# Patient Record
Sex: MALE | Race: WHITE | NOT HISPANIC OR LATINO | Employment: FULL TIME | URBAN - METROPOLITAN AREA
[De-identification: names, ages, dates, MRNs, and addresses within clinical notes are randomized per-mention and may not be internally consistent; named-entity substitution may affect disease eponyms.]

---

## 2017-11-14 ENCOUNTER — APPOINTMENT (EMERGENCY)
Dept: CT IMAGING | Facility: HOSPITAL | Age: 57
DRG: 391 | End: 2017-11-14
Payer: COMMERCIAL

## 2017-11-14 ENCOUNTER — HOSPITAL ENCOUNTER (INPATIENT)
Facility: HOSPITAL | Age: 57
LOS: 2 days | Discharge: HOME | DRG: 391 | End: 2017-11-16
Attending: EMERGENCY MEDICINE | Admitting: HOSPITALIST
Payer: COMMERCIAL

## 2017-11-14 DIAGNOSIS — K76.9 LIVER LESION: ICD-10-CM

## 2017-11-14 DIAGNOSIS — E87.1 ACUTE HYPONATREMIA: ICD-10-CM

## 2017-11-14 DIAGNOSIS — D69.6 THROMBOCYTOPENIA (HCC): ICD-10-CM

## 2017-11-14 DIAGNOSIS — K52.9 ENTERITIS: Primary | ICD-10-CM

## 2017-11-14 PROBLEM — R19.7 DIARRHEA: Status: ACTIVE | Noted: 2017-11-14

## 2017-11-14 LAB
ALBUMIN SERPL BCP-MCNC: 2.9 G/DL (ref 3.5–5)
ALP SERPL-CCNC: 92 U/L (ref 46–116)
ALT SERPL W P-5'-P-CCNC: 123 U/L (ref 12–78)
AMORPH PHOS CRY URNS QL MICRO: ABNORMAL /HPF
ANION GAP SERPL CALCULATED.3IONS-SCNC: 13 MMOL/L (ref 4–13)
APTT PPP: 33 SECONDS (ref 23–35)
AST SERPL W P-5'-P-CCNC: 66 U/L (ref 5–45)
ATRIAL RATE: 103 BPM
BACTERIA UR QL AUTO: ABNORMAL /HPF
BASOPHILS # BLD MANUAL: 0 THOUSAND/UL (ref 0–0.1)
BASOPHILS NFR MAR MANUAL: 0 % (ref 0–1)
BILIRUB SERPL-MCNC: 1 MG/DL (ref 0.2–1)
BILIRUB UR QL STRIP: NEGATIVE
BUN SERPL-MCNC: 20 MG/DL (ref 5–25)
CALCIUM SERPL-MCNC: 8.2 MG/DL (ref 8.3–10.1)
CAMPYLOBACTER DNA SPEC NAA+PROBE: NORMAL
CHLORIDE SERPL-SCNC: 87 MMOL/L (ref 100–108)
CLARITY UR: CLEAR
CO2 SERPL-SCNC: 21 MMOL/L (ref 21–32)
COARSE GRAN CASTS URNS QL MICRO: ABNORMAL /LPF
COLOR UR: YELLOW
CREAT SERPL-MCNC: 1.01 MG/DL (ref 0.6–1.3)
CREAT UR-MCNC: 96.4 MG/DL
EOSINOPHIL # BLD MANUAL: 0 THOUSAND/UL (ref 0–0.4)
EOSINOPHIL NFR BLD MANUAL: 0 % (ref 0–6)
ERYTHROCYTE [DISTWIDTH] IN BLOOD BY AUTOMATED COUNT: 13.7 % (ref 11.6–15.1)
FLUAV AG SPEC QL: NORMAL
FLUBV AG SPEC QL: NORMAL
GFR SERPL CREATININE-BSD FRML MDRD: 83 ML/MIN/1.73SQ M
GLUCOSE SERPL-MCNC: 120 MG/DL (ref 65–140)
GLUCOSE UR STRIP-MCNC: NEGATIVE MG/DL
HCT VFR BLD AUTO: 37.9 % (ref 36.5–49.3)
HEMOCCULT STL QL: NEGATIVE
HGB BLD-MCNC: 13.8 G/DL (ref 12–17)
HGB UR QL STRIP.AUTO: ABNORMAL
INR PPP: 1.07 (ref 0.86–1.16)
KETONES UR STRIP-MCNC: ABNORMAL MG/DL
LEUKOCYTE ESTERASE UR QL STRIP: NEGATIVE
LYMPHOCYTES # BLD AUTO: 1.59 THOUSAND/UL (ref 0.6–4.47)
LYMPHOCYTES # BLD AUTO: 23 % (ref 14–44)
MAGNESIUM SERPL-MCNC: 1.9 MG/DL (ref 1.6–2.6)
MCH RBC QN AUTO: 28.4 PG (ref 26.8–34.3)
MCHC RBC AUTO-ENTMCNC: 36.4 G/DL (ref 31.4–37.4)
MCV RBC AUTO: 78 FL (ref 82–98)
MONOCYTES # BLD AUTO: 0.69 THOUSAND/UL (ref 0–1.22)
MONOCYTES NFR BLD: 10 % (ref 4–12)
MUCOUS THREADS UR QL AUTO: ABNORMAL
NEUTROPHILS # BLD MANUAL: 4.37 THOUSAND/UL (ref 1.85–7.62)
NEUTS BAND NFR BLD MANUAL: 4 % (ref 0–8)
NEUTS SEG NFR BLD AUTO: 59 % (ref 43–75)
NITRITE UR QL STRIP: NEGATIVE
NON-SQ EPI CELLS URNS QL MICRO: ABNORMAL /HPF
OSMOLALITY UR/SERPL-RTO: 256 MMOL/KG (ref 282–298)
OSMOLALITY UR: 622 MMOL/KG
P AXIS: 74 DEGREES
PH UR STRIP.AUTO: 6.5 [PH] (ref 4.5–8)
PLATELET # BLD AUTO: 49 THOUSANDS/UL (ref 149–390)
PLATELET BLD QL SMEAR: ABNORMAL
PMV BLD AUTO: 12.5 FL (ref 8.9–12.7)
POTASSIUM SERPL-SCNC: 3.7 MMOL/L (ref 3.5–5.3)
POTASSIUM UR-SCNC: 29 MMOL/L (ref 1–300)
PR INTERVAL: 160 MS
PROT SERPL-MCNC: 6.9 G/DL (ref 6.4–8.2)
PROT UR STRIP-MCNC: ABNORMAL MG/DL
PROTHROMBIN TIME: 13.7 SECONDS (ref 12.1–14.4)
QRS AXIS: 70 DEGREES
QRSD INTERVAL: 90 MS
QT INTERVAL: 348 MS
QTC INTERVAL: 455 MS
RBC # BLD AUTO: 4.86 MILLION/UL (ref 3.88–5.62)
RBC #/AREA URNS AUTO: ABNORMAL /HPF
RBC MORPH BLD: NORMAL
RSV B RNA SPEC QL NAA+PROBE: NORMAL
RV AG STL QL: NEGATIVE
SALMONELLA DNA SPEC QL NAA+PROBE: NORMAL
SHIGA TOXIN STX GENE SPEC NAA+PROBE: NORMAL
SHIGELLA DNA SPEC QL NAA+PROBE: NORMAL
SODIUM 24H UR-SCNC: 29 MOL/L
SODIUM SERPL-SCNC: 121 MMOL/L (ref 136–145)
SP GR UR STRIP.AUTO: <=1.005 (ref 1–1.03)
T WAVE AXIS: 69 DEGREES
TOTAL CELLS COUNTED SPEC: 100
TSH SERPL DL<=0.05 MIU/L-ACNC: 2.23 UIU/ML (ref 0.36–3.74)
UROBILINOGEN UR QL STRIP.AUTO: 1 E.U./DL
VARIANT LYMPHS # BLD AUTO: 4 %
VENTRICULAR RATE: 103 BPM
WBC # BLD AUTO: 6.93 THOUSAND/UL (ref 4.31–10.16)
WBC #/AREA URNS AUTO: ABNORMAL /HPF

## 2017-11-14 PROCEDURE — 84133 ASSAY OF URINE POTASSIUM: CPT | Performed by: EMERGENCY MEDICINE

## 2017-11-14 PROCEDURE — 85610 PROTHROMBIN TIME: CPT | Performed by: EMERGENCY MEDICINE

## 2017-11-14 PROCEDURE — 99285 EMERGENCY DEPT VISIT HI MDM: CPT

## 2017-11-14 PROCEDURE — 87425 ROTAVIRUS AG IA: CPT | Performed by: EMERGENCY MEDICINE

## 2017-11-14 PROCEDURE — 84443 ASSAY THYROID STIM HORMONE: CPT | Performed by: EMERGENCY MEDICINE

## 2017-11-14 PROCEDURE — 82570 ASSAY OF URINE CREATININE: CPT | Performed by: EMERGENCY MEDICINE

## 2017-11-14 PROCEDURE — 84300 ASSAY OF URINE SODIUM: CPT | Performed by: EMERGENCY MEDICINE

## 2017-11-14 PROCEDURE — 83930 ASSAY OF BLOOD OSMOLALITY: CPT | Performed by: EMERGENCY MEDICINE

## 2017-11-14 PROCEDURE — 85027 COMPLETE CBC AUTOMATED: CPT | Performed by: EMERGENCY MEDICINE

## 2017-11-14 PROCEDURE — 83935 ASSAY OF URINE OSMOLALITY: CPT | Performed by: EMERGENCY MEDICINE

## 2017-11-14 PROCEDURE — 87040 BLOOD CULTURE FOR BACTERIA: CPT | Performed by: EMERGENCY MEDICINE

## 2017-11-14 PROCEDURE — 96361 HYDRATE IV INFUSION ADD-ON: CPT

## 2017-11-14 PROCEDURE — 87798 DETECT AGENT NOS DNA AMP: CPT | Performed by: EMERGENCY MEDICINE

## 2017-11-14 PROCEDURE — 36415 COLL VENOUS BLD VENIPUNCTURE: CPT | Performed by: EMERGENCY MEDICINE

## 2017-11-14 PROCEDURE — 74177 CT ABD & PELVIS W/CONTRAST: CPT

## 2017-11-14 PROCEDURE — 85007 BL SMEAR W/DIFF WBC COUNT: CPT | Performed by: EMERGENCY MEDICINE

## 2017-11-14 PROCEDURE — 89055 LEUKOCYTE ASSESSMENT FECAL: CPT | Performed by: EMERGENCY MEDICINE

## 2017-11-14 PROCEDURE — 87505 NFCT AGENT DETECTION GI: CPT | Performed by: EMERGENCY MEDICINE

## 2017-11-14 PROCEDURE — 82272 OCCULT BLD FECES 1-3 TESTS: CPT | Performed by: EMERGENCY MEDICINE

## 2017-11-14 PROCEDURE — 96360 HYDRATION IV INFUSION INIT: CPT

## 2017-11-14 PROCEDURE — 81001 URINALYSIS AUTO W/SCOPE: CPT | Performed by: EMERGENCY MEDICINE

## 2017-11-14 PROCEDURE — 80053 COMPREHEN METABOLIC PANEL: CPT | Performed by: EMERGENCY MEDICINE

## 2017-11-14 PROCEDURE — 93005 ELECTROCARDIOGRAM TRACING: CPT | Performed by: EMERGENCY MEDICINE

## 2017-11-14 PROCEDURE — 83735 ASSAY OF MAGNESIUM: CPT | Performed by: EMERGENCY MEDICINE

## 2017-11-14 PROCEDURE — 85730 THROMBOPLASTIN TIME PARTIAL: CPT | Performed by: EMERGENCY MEDICINE

## 2017-11-14 PROCEDURE — 87493 C DIFF AMPLIFIED PROBE: CPT | Performed by: HOSPITALIST

## 2017-11-14 RX ORDER — ACETAMINOPHEN 325 MG/1
650 TABLET ORAL EVERY 6 HOURS PRN
Status: DISCONTINUED | OUTPATIENT
Start: 2017-11-14 | End: 2017-11-16 | Stop reason: HOSPADM

## 2017-11-14 RX ORDER — DEXTROSE AND SODIUM CHLORIDE 5; .9 G/100ML; G/100ML
150 INJECTION, SOLUTION INTRAVENOUS CONTINUOUS
Status: DISCONTINUED | OUTPATIENT
Start: 2017-11-14 | End: 2017-11-16 | Stop reason: HOSPADM

## 2017-11-14 RX ORDER — MORPHINE SULFATE 4 MG/ML
4 INJECTION, SOLUTION INTRAMUSCULAR; INTRAVENOUS ONCE
Status: COMPLETED | OUTPATIENT
Start: 2017-11-14 | End: 2017-11-14

## 2017-11-14 RX ORDER — LISINOPRIL 20 MG/1
20 TABLET ORAL DAILY
COMMUNITY

## 2017-11-14 RX ADMIN — SODIUM CHLORIDE 1000 ML: 0.9 INJECTION, SOLUTION INTRAVENOUS at 07:43

## 2017-11-14 RX ADMIN — DIPHENHYDRAMINE HYDROCHLORIDE 50 MG: 50 INJECTION, SOLUTION INTRAMUSCULAR; INTRAVENOUS at 12:16

## 2017-11-14 RX ADMIN — DEXTROSE AND SODIUM CHLORIDE 150 ML/HR: 5; 900 INJECTION, SOLUTION INTRAVENOUS at 11:47

## 2017-11-14 RX ADMIN — SODIUM CHLORIDE 1000 ML: 0.9 INJECTION, SOLUTION INTRAVENOUS at 09:25

## 2017-11-14 RX ADMIN — IOHEXOL 100 ML: 350 INJECTION, SOLUTION INTRAVENOUS at 08:38

## 2017-11-14 RX ADMIN — MORPHINE SULFATE 4 MG: 4 INJECTION, SOLUTION INTRAMUSCULAR; INTRAVENOUS at 18:37

## 2017-11-14 RX ADMIN — ACETAMINOPHEN 650 MG: 325 TABLET ORAL at 11:47

## 2017-11-14 RX ADMIN — DEXTROSE AND SODIUM CHLORIDE 150 ML/HR: 5; 900 INJECTION, SOLUTION INTRAVENOUS at 18:42

## 2017-11-14 NOTE — ED NOTES
Pt states he woke up during night diaphoretic and thought he was hallucinating, took Nyquil at approx 2400  Patel Gibbons RN  11/14/17 9607

## 2017-11-14 NOTE — ED PROVIDER NOTES
History  Chief Complaint   Patient presents with    Diarrhea     Presents with C/O diarrhea, H/A, fever, chills, body aches x 1 week  No vomiting  This 60-year-old man complains of being ill for 2 weeks  He states that he had headache and fever for the first week and a half  Over the past 4 days he has had abdominal pain and frequent diarrhea in addition to fever and headache  He has myalgias and has had difficulty sleeping due to his headache and diarrhea  He notes some blurred vision recently  Denies nausea, vomiting, sore throat, cough, rash and dysuria  He noted dark urine recently  He feels he has been unable to drink enough fluids to keep up with his diarrhea  He denies recent foreign travel or ill contacts  States that his son this morning woke up with headache  Prior to Admission Medications   Prescriptions Last Dose Informant Patient Reported? Taking?   lisinopril (ZESTRIL) 20 mg tablet 11/13/2017 at Unknown time  Yes Yes   Sig: Take 20 mg by mouth daily      Facility-Administered Medications: None       Past Medical History:   Diagnosis Date    BPH (benign prostatic hyperplasia)     Hypertension        History reviewed  No pertinent surgical history  History reviewed  No pertinent family history  I have reviewed and agree with the history as documented  Social History   Substance Use Topics    Smoking status: Never Smoker    Smokeless tobacco: Never Used    Alcohol use 1 2 oz/week     2 Glasses of wine per week      Comment: a day    last consumption thursday        Review of Systems   Constitutional: Positive for activity change, appetite change, chills, diaphoresis, fatigue and fever  HENT: Negative  Eyes: Negative  Respiratory: Negative  Cardiovascular: Negative  Gastrointestinal: Positive for abdominal pain and diarrhea  Negative for abdominal distention, blood in stool and constipation  Endocrine: Negative  Genitourinary: Negative           Dark urine   Musculoskeletal: Positive for myalgias  Negative for neck pain and neck stiffness  Skin: Negative  Allergic/Immunologic: Negative  Neurological: Negative  Hematological: Negative  Psychiatric/Behavioral: Negative  All other systems reviewed and are negative  Physical Exam  ED Triage Vitals   Temperature Pulse Respirations Blood Pressure SpO2   11/14/17 0627 11/14/17 0626 11/14/17 0626 11/14/17 0622 11/14/17 0626   98 °F (36 7 °C) 101 21 167/86 94 %      Temp Source Heart Rate Source Patient Position - Orthostatic VS BP Location FiO2 (%)   11/14/17 0627 11/14/17 0627 11/14/17 0627 11/14/17 0627 --   Temporal Monitor Lying Right arm       Pain Score       11/14/17 0627       9           Orthostatic Vital Signs  Vitals:    11/14/17 1000 11/14/17 1107 11/14/17 1541 11/14/17 2246   BP: 147/81 141/71 122/77 133/84   Pulse: (!) 107 98 86 88   Patient Position - Orthostatic VS:  Lying Lying        Physical Exam   Constitutional: He is oriented to person, place, and time  He appears well-developed and well-nourished  No distress  HENT:   Head: Normocephalic and atraumatic  Right Ear: External ear normal    Left Ear: External ear normal    Dry oral mucosa  Eyes: Conjunctivae and EOM are normal  Pupils are equal, round, and reactive to light  Neck: Normal range of motion  Neck supple  No JVD present  Cardiovascular: Normal rate, regular rhythm, normal heart sounds and intact distal pulses  No murmur heard  Pulmonary/Chest: Effort normal and breath sounds normal    Abdominal: Soft  Bowel sounds are normal  He exhibits no mass  There is tenderness (  Of lower abdomen)  There is no rebound and no guarding  Musculoskeletal: Normal range of motion  He exhibits no edema or tenderness  Lymphadenopathy:     He has no cervical adenopathy  Neurological: He is alert and oriented to person, place, and time  He has normal reflexes  No cranial nerve deficit   Coordination normal    Skin: Skin is warm and dry  No rash noted  He is not diaphoretic  Slight edema and erythema of the skin over upper lip, approximately 1 cm x 0 5 cm  Psychiatric: He has a normal mood and affect  His behavior is normal    Nursing note and vitals reviewed        ED Medications  Medications   dextrose 5 % and sodium chloride 0 9 % infusion (150 mL/hr Intravenous New Bag 11/14/17 1842)   acetaminophen (TYLENOL) tablet 650 mg (650 mg Oral Given 11/14/17 1147)   lisinopril (ZESTRIL) tablet 20 mg (not administered)   sodium chloride 0 9 % bolus 1,000 mL (0 mL Intravenous Stopped 11/14/17 0914)   morphine (PF) 4 mg/mL injection 4 mg (4 mg Intravenous Given 11/14/17 1837)   iohexol (OMNIPAQUE) 350 MG/ML injection (MULTI-DOSE) 100 mL (100 mL Intravenous Given 11/14/17 0838)   sodium chloride 0 9 % bolus 1,000 mL (0 mL Intravenous Stopped 11/14/17 1025)   diphenhydrAMINE (BENADRYL) 50 mg in sodium chloride 0 9 % 50 mL IVPB (50 mg Intravenous New Bag 11/14/17 1216)       Diagnostic Studies  Results Reviewed     Procedure Component Value Units Date/Time    Stool Enteric Bacterial Panel by PCR [[id number]]  (Normal) Collected:  11/14/17 0737    Lab Status:  Final result Specimen:  Stool from Per Rectum Updated:  11/14/17 1454     Salmonella sp PCR None Detected     Shigella sp/Enteroinvasive E  coli (EIEC) PCR None Detected     Campylobacter sp (jejuni and coli) PCR None Detected     Shiga toxin 1/Shiga tonix 2 genes PCR None Detected    Sodium, urine, random [[id number]]  (Normal) Collected:  11/14/17 1027    Lab Status:  Final result Specimen:  Urine from Urine, Clean Catch Updated:  11/14/17 1400     Sodium, Ur 29    Potassium, urine, random [[id number]]  (Normal) Collected:  11/14/17 1027    Lab Status:  Final result Specimen:  Urine from Urine, Clean Catch Updated:  11/14/17 1400     Potassium Urine Timed 29 0 mmol/L     Creatinine, urine, random [[id number]]  (Normal) Collected:  11/14/17 1027    Lab Status:  Final result Specimen: Urine from Urine, Clean Catch Updated:  11/14/17 1333     Creatinine, Ur 96 4 mg/dL     Osmolality, urine [[id number]]  (Normal) Collected:  11/14/17 1027    Lab Status:  Final result Specimen:  Urine from Urine, Clean Catch Updated:  11/14/17 1247     Osmolality, Ur 622 mmol/KG     Rotavirus antigen, stool [[id number]]  (Normal) Collected:  11/14/17 0736    Lab Status:  Final result Specimen:  Stool from Rectum Updated:  11/14/17 1239     Rotavirus Negative    Influenza A/B and RSV by PCR (indicated for patients >2 mo of age) [[id number]]  (Normal) Collected:  11/14/17 0707    Lab Status:  Final result Specimen:  Nasopharyngeal from Nasopharyngeal Swab Updated:  11/14/17 1232     INFLU A PCR None Detected     INFLU B PCR None Detected     RSV PCR None Detected    Osmolality [[id number]]  (Abnormal) Collected:  11/14/17 1027    Lab Status:  Final result Specimen:  Blood from Arm, Right Updated:  11/14/17 1232     Osmolality Serum 256 (L) mmol/KG     Urine Microscopic [[id number]]  (Abnormal) Collected:  11/14/17 1032    Lab Status:  Final result Specimen:  Urine from Urine, Clean Catch Updated:  11/14/17 1104     RBC, UA 0-1 (A) /hpf      WBC, UA 0-1 (A) /hpf      Epithelial Cells Occasional /hpf      Bacteria, UA Moderate (A) /hpf      COARSE GRANULAR CASTS 10-20 /lpf      AMORPH PHOSPATES Moderate /hpf      MUCOUS THREADS Occasional    UA w Reflex to Microscopic w Reflex to Culture [[id number]]  (Abnormal) Collected:  11/14/17 1032    Lab Status:  Final result Specimen:  Urine from Urine, Clean Catch Updated:  11/14/17 1047     Color, UA Yellow     Clarity, UA Clear     Specific Gravity, UA <=1 005     pH, UA 6 5     Leukocytes, UA Negative     Nitrite, UA Negative     Protein, UA 30 (1+) (A) mg/dl      Glucose, UA Negative mg/dl      Ketones, UA 40 (2+) (A) mg/dl      Urobilinogen, UA 1 0 E U /dl      Bilirubin, UA Negative     Blood, UA Small (A)    Magnesium [[id number]]  (Normal) Collected:  11/14/17 0707    Lab Status: Final result Specimen:  Blood from Arm, Right Updated:  11/14/17 1012     Magnesium 1 9 mg/dL     TSH [[id number]]  (Normal) Collected:  11/14/17 0707    Lab Status:  Final result Specimen:  Blood from Arm, Right Updated:  11/14/17 0926     TSH 3RD GENERATON 2 226 uIU/mL     Narrative:         Patients undergoing fluorescein dye angiography may retain small amounts of fluorescein in the body for 48-72 hours post procedure  Samples containing fluorescein can produce falsely depressed TSH values  If the patient had this procedure,a specimen should be resubmitted post fluorescein clearance  APTT [[id number]]  (Normal) Collected:  11/14/17 0855    Lab Status:  Final result Specimen:  Blood from Arm, Right Updated:  11/14/17 0916     PTT 33 seconds     Narrative:          Therapeutic Heparin Range = 60-90 seconds    Protime-INR [[id number]]  (Normal) Collected:  11/14/17 0855    Lab Status:  Final result Specimen:  Blood from Arm, Right Updated:  11/14/17 0916     Protime 13 7 seconds      INR 1 07    Occult Blood 1-3 Stool, (Diagnostic) [[id number]] Collected:  11/14/17 0736    Lab Status:  Final result Specimen:  Stool from Rectum Updated:  11/14/17 0826     Fecal Occult Blood Diagnostic Negative     Fecal Occult Blood Diagnostic 2 --     Fecal Occult Blood Diagnostic 3 --    CBC and differential [[id number]]  (Abnormal) Collected:  11/14/17 0707    Lab Status:  Final result Specimen:  Blood from Arm, Right Updated:  11/14/17 0822     WBC 6 93 Thousand/uL      RBC 4 86 Million/uL      Hemoglobin 13 8 g/dL      Hematocrit 37 9 %      MCV 78 (L) fL      MCH 28 4 pg      MCHC 36 4 g/dL      RDW 13 7 %      MPV 12 5 fL      Platelets 49 (LL) Thousands/uL     Narrative:        No Clots    Comprehensive metabolic panel [[id number]]  (Abnormal) Collected:  11/14/17 0707    Lab Status:  Final result Specimen:  Blood from Arm, Right Updated:  11/14/17 0808     Sodium 121 (L) mmol/L      Potassium 3 7 mmol/L      Chloride 87 (L) mmol/L CO2 21 mmol/L      Anion Gap 13 mmol/L      BUN 20 mg/dL      Creatinine 1 01 mg/dL      Glucose 120 mg/dL      Calcium 8 2 (L) mg/dL      AST 66 (H) U/L       (H) U/L      Alkaline Phosphatase 92 U/L      Total Protein 6 9 g/dL      Albumin 2 9 (L) g/dL      Total Bilirubin 1 00 mg/dL      eGFR 83 ml/min/1 73sq m     Narrative:         National Kidney Disease Education Program recommendations are as follows:  GFR calculation is accurate only with a steady state creatinine  Chronic Kidney disease less than 60 ml/min/1 73 sq  meters  Kidney failure less than 15 ml/min/1 73 sq  meters  Fecal leukocytes [[id number]] Collected:  11/14/17 0737    Lab Status: In process Specimen:  Stool from Per Rectum Updated:  11/14/17 9785    Blood culture #1 [[id number]] Collected:  11/14/17 0707    Lab Status: In process Specimen:  Blood from Arm, Left Updated:  11/14/17 0746    Blood culture #2 [[id number]] Collected:  11/14/17 0707    Lab Status: In process Specimen:  Blood from Arm, Right Updated:  11/14/17 0746                 CT abdomen pelvis with contrast   Final Result by Alfonso Harper MD (11/14 0856)         1  Increased fluid within small bowel loops within the right lower quadrant with mild mucosal hyperenhancement, which may indicate enteritis  No bowel obstruction  2   1 2 x 1 0 cm hypoattenuating lesion within the hepatic dome that does not measure simple fluid and may possibly represent hemangioma  This can be confirmed with liver protocol MRI  Workstation performed: [id number]                    Procedures  Procedures       Phone Contacts  ED Phone Contact    ED Course  ED Course as of Nov 15 0112   Tue Nov 14, 2017   0710  Patient refused narcotics                                MDM  Number of Diagnoses or Management Options  Acute hyponatremia:   Enteritis:   Liver lesion: Thrombocytopenia [facility]):   Diagnosis management comments: All patient care transferred to Dr Jennifer Leary at 7:00 a m  Prior to lab and imaging results    CritCare Time    Disposition  Final diagnoses:   Enteritis   Acute hyponatremia   Liver lesion   Thrombocytopenia ([facility] [address] )     Time reflects when diagnosis was documented in both MDM as applicable and the Disposition within this note     Time User Action Codes Description Comment    11/14/2017 10:07 AM Ruddy Radha Add [K52 9] Enteritis     11/14/2017 10:08 AM Ruddy Radha Modify [K52 9] Enteritis     11/14/2017 10:09 AM Ruddy Radha Add [E87 1] Acute hyponatremia     11/14/2017 10:09 AM Ruddy Radha Add [K76 9] Liver lesion     11/14/2017 10:09 AM Ruddy Radha Modify [K76 9] Liver lesion     11/14/2017 10:09 AM Ruddy Radha Add [D69 6] Thrombocytopenia ([facility] [address] )     11/14/2017 10:09 AM Ruddy Radha Modify [D69 6] Thrombocytopenia [facility])       ED Disposition     ED Disposition Condition Comment    Admit  Case was discussed with Laura Leal and the patient's admission status was agreed to be Admission Status: inpatient status to the service of Dr Kenneth Astorga   Follow-up Information    None       Current Discharge Medication List      CONTINUE these medications which have NOT CHANGED    Details   lisinopril (ZESTRIL) 20 mg tablet Take 20 mg by mouth daily           No discharge procedures on file      ED Provider  Electronically Signed by           Ladi Moe DO  11/15/17 8169

## 2017-11-14 NOTE — SOCIAL WORK
Met with patient with his wife, Tom Mcdermott present  Discussed role of Care Management  Patient resides with his wife and family in a [place] home  He is independent of ADL's, uses no assistive device, drives and is employed  He plans to return home and anticipates no discharge needs

## 2017-11-14 NOTE — ED CARE HANDOFF
Emergency Department Sign Out Note        Sign out and transfer of care from [facility]*  See Separate Emergency Department note  The patient, Anders Jewell, was evaluated by the previous provider for fever, headache, body aches, diarrhea*  Workup Completed:  Labs and imaging just ordered    ED Course / Workup Pending (followup): Labs reveal thrombocytopenia and significant hyponatremia  CT with enteritis  ED Course as of Nov 15 0916   Tue Nov 14, 2017   0926 Enteritis, hyponatremia, thrombocytopenia, paged Guilherme Gutiérrez to see if patient can be admitted    0954 Patient admits to drinking about 2 beers daily and had some mixed drinks the night before he started to get sick  8834 Patient eating large quantities of raisins, almonds, bananas to keep sodium low to lower blood pressure        ECG 12 Lead Documentation  Date/Time: 11/14/2017 10:40 AM  Performed by: Kelvin Araujo  Authorized by: Kelvin Araujo     Indications / Diagnosis:  Hyponatremia  ECG reviewed by me, the ED Provider: yes    Patient location:  ED  Previous ECG:     Previous ECG:  Unavailable  Interpretation:     Interpretation: non-specific    Rate:     ECG rate:  103    ECG rate assessment: tachycardic    Rhythm:     Rhythm: sinus tachycardia    Ectopy:     Ectopy: none    QRS:     QRS axis:  Normal    QRS intervals:  Normal  Conduction:     Conduction: normal    ST segments:     ST segments:  Normal  T waves:     T waves: normal          MDM  Number of Diagnoses or Management Options  Acute hyponatremia: new and requires workup  Enteritis: new and requires workup  Liver lesion: new and requires workup  Thrombocytopenia ([facility] [address] ): new and requires workup     Amount and/or Complexity of Data Reviewed  Clinical lab tests: reviewed  Tests in the radiology section of CPT®: reviewed  Discuss the patient with other providers: yes    Patient Progress  Patient progress: improved    CritCare Time      Disposition  Final diagnoses: Enteritis   Acute hyponatremia   Liver lesion   Thrombocytopenia ([facility] [address] )     Time reflects when diagnosis was documented in both MDM as applicable and the Disposition within this note     Time User Action Codes Description Comment    11/14/2017 10:07 AM Roylene Karvonen Add [K52 9] Enteritis     11/14/2017 10:08 AM Roylene Karvonen Modify [K52 9] Enteritis     11/14/2017 10:09 AM Roylene Karvonen Add [E87 1] Acute hyponatremia     11/14/2017 10:09 AM Roylene Karvonen Add [K76 9] Liver lesion     11/14/2017 10:09 AM Roylene Karvonen Modify [K76 9] Liver lesion     11/14/2017 10:09 AM Roylene Karvonen Add [D69 6] Thrombocytopenia ([facility] [address] )     11/14/2017 10:09 AM Roylene Karvonen Modify [D69 6] Thrombocytopenia [facility])       ED Disposition     ED Disposition Condition Comment    Admit  Case was discussed with Mac Cifuentes and the patient's admission status was agreed to be Admission Status: inpatient status to the service of Dr Sadaf Dale   Follow-up Information    None       Current Discharge Medication List      CONTINUE these medications which have NOT CHANGED    Details   lisinopril (ZESTRIL) 20 mg tablet Take 20 mg by mouth daily           No discharge procedures on file         ED Provider  Electronically Signed by

## 2017-11-14 NOTE — H&P
History and Physical - [facility]    Patient Information: David Tee 65 y o  male MRN: [id number]  Unit/Bed#: [address] [place] [address] Encounter: [id number]  Admitting Physician: Justyna Adrian MD  PCP: Tammy Gr MD  Date of Admission:  11/14/17    Assessment/Plan:    Hospital Problem List:     Principal Problem:    Diarrhea  Active Problems:    Acute hyponatremia    Enteritis    Thrombocytopenia ([address] )    Liver lesion      Plan:  Hyponatremia:  -likely due to a somewhat tea and toast diet as well as volume loss from diarrhea in the setting of alcohol use  -will give D5 normal saline at 150 cc an hour  -check urine and serum osmolality  -check urine sodium and creatinine  -trend sodium daily  -patient currently asymptomatic at this time    Gastroenteritis with diarrhea:  -patient currently is without leukocytosis but is febrile  -stool studies are pending  -we will order C diff toxin although I think it is unlikely that the patient actually has C diff  -IV fluids as above  -no antibiotics at this time pending stool studies (likely viral)    Thrombocytopenia:  -I suspect this is related to the patient's alcohol use and acute illness leading to bone marrow suppression  -will continue to trend the patient's platelets    VTE Prophylaxis: Pharmacologic VTE Prophylaxis contraindicated due to Thrombocytopenia  / sequential compression device   Code Status:  Full  POLST: POLST is not applicable to this patient    Anticipated Length of Stay:  Patient will be admitted on an Inpatient basis with an anticipated length of stay of  > 2 midnights  Justification for Hospital Stay:  Hyponatremia    Total Time for Visit, including Counseling / Coordination of Care: 30 minutes  Greater than 50% of this total time spent on direct patient counseling and coordination of care      Chief Complaint:   Fever and diarrhea    History of Present Illness:    David Tee is a 65 y o  male who presents with multiple complaints, most notably fever and diarrhea  Patient reports that he has been sick for approximately 2 weeks  For the 1st week and a half he had intermittent fevers and associated generalized headache that started in his occipital region and radiated throughout the rest of his head  Three days ago the patient began having watery stools  He denies any blood or mucus in the stool  He denies any sick contacts  Denies any recent antibiotic use  He has had associated, severe, cramping, diffuse abdominal pain  The patient denies any modifying factors  He does report chills and diaphoresis  Patient denies chest pain, shortness of breath, cough, palpitations  Patient denies any neck stiffness or photophobia  He denies any lightheadedness, dysuria, rash, focal neurological deficit  Review of Systems:    Review of Systems   All other systems reviewed and are negative  Past Medical and Surgical History:     Past Medical History:   Diagnosis Date    BPH (benign prostatic hyperplasia)     Hypertension        History reviewed  No pertinent surgical history  Meds/Allergies:    Prior to Admission medications    Medication Sig Start Date End Date Taking? Authorizing Provider   lisinopril (ZESTRIL) 20 mg tablet Take 20 mg by mouth daily   Yes Historical Provider, MD     I have reviewed home medications with patient personally      Allergies: No Known Allergies    Social History:     Marital Status: Single   Occupation:    Patient Pre-hospital Living Situation:  With wife  Patient Pre-hospital Level of Mobility:  Ambulatory  Patient Pre-hospital Diet Restrictions:  None  Substance Use History:   History   Alcohol Use    Yes     Comment: occasional     History   Smoking Status    Never Smoker   Smokeless Tobacco    Never Used     History   Drug Use No       Family History:    non-contributory    Physical Exam:     Vitals:   Blood Pressure: 141/71 (11/14/17 1107)  Pulse: 98 (11/14/17 1107)  Temperature: (!) 101 5 °F (38 6 °C) (11/14/17 1107)  Temp Source: Tympanic (11/14/17 1107)  Respirations: 20 (11/14/17 1107)  Height: 6' (182 9 cm) (11/14/17 1107)  Weight - Scale: 87 5 kg (192 lb 14 4 oz) (11/14/17 1107)  SpO2: 97 % (11/14/17 1107)    Physical Exam    Gen: NAD, AAOx3, well developed, well nourished  Eyes: EOMI, PERRLA, no scleral icterus  ENMT:  Oropharynx clear of erythema or exudates, no nasal discharge, no otic discharge, dry mucous membranes  Neck:  Supple  Lymph:  No anterior or posterior cervical or supraclavicular lymphadenopathy  Cardiovascular:  Regular rate and rhythm, normal S1-S2, no murmurs, rubs, or gallops  Lungs:  Clear to auscultation bilaterally, no wheezes, or rales, or rhonchi  Abdomen:  Positive bowel sounds, soft, nontender, nondistended, no palpable organomegaly   Skin:  Intact, no obvious lesions or rashes, no edema  Neuro: Cranial nerves 2-12 are intact, non-focal, 5/5 strength in all 4 extremities      Additional Data:     Lab Results: I have personally reviewed pertinent reports  Results from last 7 days  Lab Units 11/14/17  0707   WBC Thousand/uL 6 93   HEMOGLOBIN g/dL 13 8   HEMATOCRIT % 37 9   PLATELETS Thousands/uL 49*   LYMPHO PCT % 23   MONO PCT MAN % 10   EOSINO PCT MANUAL % 0       Results from last 7 days  Lab Units 11/14/17  0707   SODIUM mmol/L 121*   POTASSIUM mmol/L 3 7   CHLORIDE mmol/L 87*   CO2 mmol/L 21   BUN mg/dL 20   CREATININE mg/dL 1 01   CALCIUM mg/dL 8 2*   TOTAL PROTEIN g/dL 6 9   BILIRUBIN TOTAL mg/dL 1 00   ALK PHOS U/L 92   ALT U/L 123*   AST U/L 66*   GLUCOSE RANDOM mg/dL 120       Results from last 7 days  Lab Units 11/14/17  0855   INR  1 07       Imaging: I have personally reviewed pertinent reports  Ct Abdomen Pelvis With Contrast    Result Date: 11/14/2017  Narrative: CT ABDOMEN AND PELVIS WITH IV CONTRAST INDICATION:  Fever, diarrhea, lower abdominal pain  COMPARISON: None   TECHNIQUE:  CT examination of the abdomen and pelvis was performed  Reformatted images were created in axial, sagittal, and coronal planes  Radiation dose length product (DLP) for this visit:  424 mGy-cm   This examination, like all CT scans performed in the [facility], was performed utilizing techniques to minimize radiation dose exposure, including the use of iterative reconstruction and automated exposure control  IV Contrast:  100 mL of iohexol (OMNIPAQUE)     Enteric Contrast:  Enteric contrast was not administered  FINDINGS: ABDOMEN LOWER CHEST:  Right basilar subsegmental atelectasis is present  Trace pericardial effusion is present  LIVER/BILIARY TREE:  There is a 1 2 x 1 0 cm hypoattenuating lesion within the hepatic dome that does not measure simple fluid and possibly represents a hemangioma  No biliary ductal dilatation identified  Cyst and hypoattenuating foci that are too small to characterize but likely represent cysts are seen within the kidneys  There are no calculi hydronephrosis  GALLBLADDER:  No calcified gallstones  No pericholecystic inflammatory change  SPLEEN:  Unremarkable  PANCREAS:  Unremarkable  ADRENAL GLANDS:  Unremarkable  KIDNEYS/URETERS:  Unremarkable  No hydronephrosis  STOMACH AND BOWEL:  There is increased fluid within small bowel loops within the right lower quadrant with mild mucosal hyperenhancement, which may indicate enteritis  No bowel obstruction  APPENDIX:  A normal appendix was visualized  ABDOMINOPELVIC CAVITY:  No ascites or free intraperitoneal air  No lymphadenopathy  VESSELS:  Unremarkable for patient's age  PELVIS REPRODUCTIVE ORGANS:  Unremarkable for patient's age  URINARY BLADDER:  Unremarkable  ABDOMINAL WALL/INGUINAL REGIONS:  Unremarkable  OSSEOUS STRUCTURES:  No acute fracture or destructive osseous lesion  Impression: 1  Increased fluid within small bowel loops within the right lower quadrant with mild mucosal hyperenhancement, which may indicate enteritis  No bowel obstruction   2  1 2 x 1 0 cm hypoattenuating lesion within the hepatic dome that does not measure simple fluid and may possibly represent hemangioma  This can be confirmed with liver protocol MRI  Workstation performed: [id number]       [facility] Yes     ** Please Note: This note has been constructed using a voice recognition system   **

## 2017-11-14 NOTE — PROGRESS NOTES
New admission feeling weak  Having diarrhea  Since Friday   looks sick    flushed skin with temp 101 4 rash on arms and legs with itch  Jerryl Rm abdomen tender    Stool for c diff sent along with other specimens sent from ED    IVF infusing    lora minimal amt lunch no resp distress

## 2017-11-14 NOTE — CASE MANAGEMENT
Initial Clinical Review    Admission: Date/Time/Statement: 11/14/17 @ 1011     Orders Placed This Encounter   Procedures    Inpatient Admission (expected length of stay for this patient is greater than two midnights)     Standing Status:   Standing     Number of Occurrences:   1     Order Specific Question:   Admitting Physician     Answer:   Tiffani Jones [[id number]]     Order Specific Question:   Level of Care     Answer:   Med Surg [[id number]]     Order Specific Question:   Estimated length of stay     Answer:   More than 2 Midnights     Order Specific Question:   Certification     Answer:   I certify that inpatient services are medically necessary for this patient for a duration of greater than two midnights  See H&P and MD Progress Notes for additional information about the patient's course of treatment  ED: Date/Time/Mode of Arrival:   ED Arrival Information     Expected Arrival Acuity Means of Arrival Escorted By Service Admission Type    - 11/14/2017 06:14 Urgent Walk-In Self General Medicine Urgent    Arrival Complaint    flu-like symptoms          Chief Complaint:   Chief Complaint   Patient presents with    Diarrhea     Presents with C/O diarrhea, H/A, fever, chills, body aches x 1 week  No vomiting  History of Illness: 65 y o  male who presents with multiple complaints, most notably fever and diarrhea  Patient reports that he has been sick for approximately 2 weeks  For the 1st week and a half he had intermittent fevers and associated generalized headache that started in his occipital region and radiated throughout the rest of his head  Three days ago the patient began having watery stools  He denies any blood or mucus in the stool  He denies any sick contacts  Denies any recent antibiotic use  He has had associated, severe, cramping, diffuse abdominal pain  The patient denies any modifying factors  He does report chills and diaphoresis      ED Vital Signs:   ED Triage Vitals   Temperature Pulse Respirations Blood Pressure SpO2   11/14/17 0627 11/14/17 0626 11/14/17 0626 11/14/17 0622 11/14/17 0626   98 °F (36 7 °C) 101 21 167/86 94 %      Temp Source Heart Rate Source Patient Position - Orthostatic VS BP Location FiO2 (%)   11/14/17 0627 11/14/17 0627 11/14/17 0627 11/14/17 0627 --   Temporal Monitor Lying Right arm       Pain Score       11/14/17 0627       9        Wt Readings from Last 1 Encounters:   11/14/17 87 5 kg (192 lb 14 4 oz)       Vital Signs (abnormal):  Maximum temperature 101 5  Sustained pulse 101- 109  Maximum respiratory rate 24  Exam - tenderness lower abdomen  Slight edema and erythema of the skin over upper lip, approximately 1 cm x 0 5 cm  Abnormal Labs/Diagnostic Test Results:   Serum osmolarity 256  UA 1+ protein  2 + ketones  Small blood  Platelets 49  Na 131  Cl 87  Calcium 8 2  ast 66  Alt 123  Albumin 2 9    Ct abdomen - Increased fluid within small bowel loops within the right lower quadrant with mild mucosal hyperenhancement, which may indicate enteritis   No bowel obstruction  2   1 2 x 1 0 cm hypoattenuating lesion within the hepatic dome that does not measure simple fluid and may possibly represent hemangioma   This can be confirmed with liver protocol MRI     Labs 11/15/2017- na 129, cl 98  Calcium 7 3   ast 50  Alt 92  Total protein 5 6  Albumin 2 2  ED Treatment: blood cultures     Medication Administration from 11/14/2017 0614 to 11/14/2017 1059       Date/Time Order Dose Route Action Action by Comments     11/14/2017 0914 sodium chloride 0 9 % bolus 1,000 mL 0 mL Intravenous Stopped Randell Lopez RN      11/14/2017 0743 sodium chloride 0 9 % bolus 1,000 mL 1,000 mL Intravenous [address] 37 Randell Lopez RN      11/14/2017 0815 morphine (PF) 4 mg/mL injection 4 mg 4 mg Intravenous Not Given Randell Lopez RN      11/14/2017 0838 iohexol (OMNIPAQUE) 350 MG/ML injection (MULTI-DOSE) 100 mL 100 mL Intravenous Given Twila Becerra 11/14/2017 1025 sodium chloride 0 9 % bolus 1,000 mL 0 mL Intravenous Stopped Priscila Robison RN      11/14/2017 0925 sodium chloride 0 9 % bolus 1,000 mL 1,000 mL Intravenous New Bag Priscila Robison RN           Past Medical/Surgical History: Active Ambulatory Problems     Diagnosis Date Noted    No Active Ambulatory Problems     Resolved Ambulatory Problems     Diagnosis Date Noted    No Resolved Ambulatory Problems     Past Medical History:   Diagnosis Date    BPH (benign prostatic hyperplasia)     Hypertension        Admitting Diagnosis: Diarrhea [R19 7]  Acute hyponatremia [E87 1]  Enteritis [K52 9]  Thrombocytopenia ([address] ) [D69 6]  Liver lesion [K76 9]    Age/Sex: 65 y o  male    Assessment/Plan: Hyponatremia:  -likely due to a somewhat tea and toast diet as well as volume loss from diarrhea in the setting of alcohol use  -will give D5 normal saline at 150 cc an hour  -check urine and serum osmolality  -check urine sodium and creatinine  -trend sodium daily  -patient currently asymptomatic at this time     Gastroenteritis with diarrhea:  -patient currently is without leukocytosis but is febrile  -stool studies are pending  -we will order C diff toxin although I think it is unlikely that the patient actually has C diff  -IV fluids as above  -no antibiotics at this time pending stool studies (likely viral)     Thrombocytopenia:  -I suspect this is related to the patient's alcohol use and acute illness leading to bone marrow suppression  -will continue to trend the patient's platelets    Admission Orders:  11/14/2017  1011 INPATIENT   Scheduled Meds:   morphine injection 4 mg Intravenous Once     Continuous Infusions:   dextrose 5 % and sodium chloride 0 9 % 150 mL/hr Last Rate: 150 mL/hr (11/14/17 1147)     PRN Meds:   acetaminophen - used x 1       OTHER ORDERS:scds  Stool fecal leukocytes; enteric bacterial panel      St  [facility] in the [facility] by [address] for 2017  Network Utilization Review Department  Phone: [phone]; Fax [phone]  ATTENTION: The Network Utilization Review Department is now centralized for our 7 Facilities  Make a note that we have a new phone and fax numbers for our Department  Please call with any questions or concerns to [phone] and carefully follow the prompts so that you are directed to the right person  All voicemails are confidential  Fax any determinations, approvals, denials, and requests for initial or continue stay review clinical to [phone]  Due to HIGH CALL volume, it would be easier if you could please send faxed requests to expedite your requests and in part, help us provide discharge notifications faster

## 2017-11-15 LAB
ALBUMIN SERPL BCP-MCNC: 2.2 G/DL (ref 3.5–5)
ALP SERPL-CCNC: 64 U/L (ref 46–116)
ALT SERPL W P-5'-P-CCNC: 92 U/L (ref 12–78)
ANION GAP SERPL CALCULATED.3IONS-SCNC: 7 MMOL/L (ref 4–13)
AST SERPL W P-5'-P-CCNC: 50 U/L (ref 5–45)
BILIRUB SERPL-MCNC: 0.5 MG/DL (ref 0.2–1)
BUN SERPL-MCNC: 9 MG/DL (ref 5–25)
C DIFF TOX GENS STL QL NAA+PROBE: NORMAL
CALCIUM SERPL-MCNC: 7.3 MG/DL (ref 8.3–10.1)
CHLORIDE SERPL-SCNC: 98 MMOL/L (ref 100–108)
CO2 SERPL-SCNC: 24 MMOL/L (ref 21–32)
CREAT SERPL-MCNC: 0.89 MG/DL (ref 0.6–1.3)
GFR SERPL CREATININE-BSD FRML MDRD: 96 ML/MIN/1.73SQ M
GLUCOSE SERPL-MCNC: 122 MG/DL (ref 65–140)
GLUCOSE SERPL-MCNC: 85 MG/DL (ref 65–140)
POTASSIUM SERPL-SCNC: 3.6 MMOL/L (ref 3.5–5.3)
PROT SERPL-MCNC: 5.6 G/DL (ref 6.4–8.2)
SODIUM SERPL-SCNC: 129 MMOL/L (ref 136–145)

## 2017-11-15 PROCEDURE — 80053 COMPREHEN METABOLIC PANEL: CPT | Performed by: HOSPITALIST

## 2017-11-15 PROCEDURE — 82948 REAGENT STRIP/BLOOD GLUCOSE: CPT

## 2017-11-15 RX ORDER — LISINOPRIL 20 MG/1
20 TABLET ORAL DAILY
Status: DISCONTINUED | OUTPATIENT
Start: 2017-11-15 | End: 2017-11-16 | Stop reason: HOSPADM

## 2017-11-15 RX ADMIN — DEXTROSE AND SODIUM CHLORIDE 150 ML/HR: 5; 900 INJECTION, SOLUTION INTRAVENOUS at 21:11

## 2017-11-15 RX ADMIN — DEXTROSE AND SODIUM CHLORIDE 150 ML/HR: 5; 900 INJECTION, SOLUTION INTRAVENOUS at 06:19

## 2017-11-15 RX ADMIN — DEXTROSE AND SODIUM CHLORIDE 150 ML/HR: 5; 900 INJECTION, SOLUTION INTRAVENOUS at 13:57

## 2017-11-15 NOTE — PROGRESS NOTES
Pt observed to be awake during most hrly rounds overnight; denies pain  NO stools overnight    Recv'd result this am NEGative for C-diff

## 2017-11-15 NOTE — PROGRESS NOTES
Pt cooperative and pleasant per report from [address]  Upon entering room to introduce myself pt is sleeping  Pt appears comfortable at this time  Will monitor

## 2017-11-15 NOTE — CASE MANAGEMENT
Notification of Inpatient Admission/Inpatient Authorization Request  This is a Notification of Inpatient Admission/Request for Inpatient Authorization to our facility [address]  Please be advised that this patient is currently in our facility under Inpatient Status  Below you will find the Attending Physician and Facilitys information including NPI# and contact information for the Utilization  assigned to the [facility] where the patient is receiving services  Please feel free to contact the Utilization Review Department with any questions  Patient Information:  PATIENT NAME: Xavier Diane  MRN: [id number]  YOB: 1960    PRESENTATION DATE: 11/14/2017  6:19 AM  IP ADMISSION DATE: 11/14/17 1011  DISCHARGE DATE: No discharge date for patient encounter  DISPOSITION: Final discharge disposition not confirmed    Attending Physician:  KYLE Cotto  Specialty- Hospitalist  Medicare Number-   Medicaid Number -   UPIN Number -   National Practioner ID- [id number]     Primary Office:  [facility] [place], [address] [place] [address]  Phone 1:   Phone 2:   Fax:      Facility:  [address] [place] [address] [facility], [address]  [phone]  Tax ID: [id number]  NPI: [id number]  [address] [place] [address] in the [facility] by [facility] for 2017  Network Utilization Review Department  Phone: [phone]; Fax [phone]  ATTENTION: The Network Utilization Review Department is now centralized for our 7 Facilities  Make a note that we have a new phone and fax numbers for our Department  Please call with any questions or concerns to [phone] and carefully follow the prompts so that you are directed to the right person  All voicemails are confidential  Fax any determinations, approvals, denials, and requests for initial or continue stay review clinical to [phone]   Due to HIGH CALL volume, it would be easier if you could please send faxed requests to expedite your requests and in part, help us provide discharge notifications faster

## 2017-11-15 NOTE — PROGRESS NOTES
[facility] Progress Note  Patient: Jimbo Lau 65 y o  male   MRN: [id number]  PCP: Tony Mclean MD  Unit/Bed#: [address] [place] [address] Encounter: [id number]  Date Of Visit: 11/15/17    Assessment:    Principal Problem:    Diarrhea  Active Problems:    Acute hyponatremia    Enteritis    Thrombocytopenia (HCC)    Liver lesion      Plan:  Hyponatremia:  -likely due to a somewhat tea and toast diet as well as volume loss from diarrhea in the setting of alcohol use  Patient's urine osmolality, urine electrolytes, and serum osmolalities support this   -continue D5 normal saline at 150 cc an hour  -sodium today is 129  -patient currently asymptomatic at this time     Gastroenteritis with diarrhea:  -patient currently is without leukocytosis  Was febrile yesterday but afebrile now  -stool studies all negative  Specifically seminoma, Shigella, Campylobacter, Shiga toxin, C diff toxin, rotavirus all negative  -we will order C diff toxin although I think it is unlikely that the patient actually has C diff  -IV fluids as above  -no antibiotics required as this is likely viral gastroenteritis     Thrombocytopenia:  -I suspect this is related to the patient's alcohol use and acute illness leading to bone marrow suppression  -will continue to trend the patient's platelets  No CBC this morning  Okay to recheck CBC tomorrow morning as patient without evidence of bleeding  Albumin of 2 2 suggests severe protein calorie malnutrition  The patient has been on a low-calorie diet  I will consult nutrition  I have told the patient that he needs to liberalize his diet  VTE Pharmacologic Prophylaxis:   Pharmacologic: Pharmacologic VTE Prophylaxis contraindicated due to Thrombocytopenia  Mechanical VTE Prophylaxis in Place: Yes    Patient Centered Rounds: I have performed bedside rounds with nursing staff today  Education and Discussions with Family / Patient:  Patient    Time Spent for Care: 20 minutes    More than 50% of total time spent on counseling and coordination of care as described above  Current Length of Stay: 1 day(s)    Current Patient Status: Inpatient   Certification Statement: The patient will continue to require additional inpatient hospital stay due to Gastroenteritis, hyponatremia    Discharge Plan / Estimated Discharge Date:  Tomorrow    Code Status: Level 1 - Full Code      Subjective:   Patient reports diarrhea has resolved and abdominal pain has nearly resolved  Objective:     Vitals:   Temp (24hrs), Av 2 °F (37 3 °C), Min:97 8 °F (36 6 °C), Max:101 5 °F (38 6 °C)    HR:  [] 81  Resp:  [18-20] 18  BP: (122-147)/(71-91) 140/91  SpO2:  [97 %-99 %] 97 %  Body mass index is 26 16 kg/m²  Input and Output Summary (last 24 hours):        Intake/Output Summary (Last 24 hours) at 11/15/17 0952  Last data filed at 11/15/17 0750   Gross per 24 hour   Intake           2422 5 ml   Output             1075 ml   Net           1347 5 ml       Physical Exam:     Physical Exam  Gen: NAD, AAOx3, well developed, well nourished  Eyes: EOMI, PERRLA, no scleral icterus  ENMT:  Oropharynx clear of erythema or exudates, no nasal discharge, no otic discharge, moist mucous membranes  Neck:  Supple  Lymph:  No anterior or posterior cervical or supraclavicular lymphadenopathy  Cardiovascular:  Regular rate and rhythm, normal S1-S2, no murmurs, rubs, or gallops  Lungs:  Clear to auscultation bilaterally, no wheezes, or rales, or rhonchi  Abdomen:  Positive bowel sounds, soft, nontender, nondistended, no palpable organomegaly   Skin:  Intact, no obvious lesions or rashes, no edema  Neuro: Cranial nerves 2-12 are intact, non-focal, 5/5 strength in all 4 extremities      Additional Data:     Labs:      Results from last 7 days  Lab Units 17  0707   WBC Thousand/uL 6 93   HEMOGLOBIN g/dL 13 8   HEMATOCRIT % 37 9   PLATELETS Thousands/uL 49*   LYMPHO PCT % 23   MONO PCT MAN % 10   EOSINO PCT MANUAL % 0 Results from last 7 days  Lab Units 11/15/17  0502   SODIUM mmol/L 129*   POTASSIUM mmol/L 3 6   CHLORIDE mmol/L 98*   CO2 mmol/L 24   BUN mg/dL 9   CREATININE mg/dL 0 89   CALCIUM mg/dL 7 3*   TOTAL PROTEIN g/dL 5 6*   BILIRUBIN TOTAL mg/dL 0 50   ALK PHOS U/L 64   ALT U/L 92*   AST U/L 50*   GLUCOSE RANDOM mg/dL 122       Results from last 7 days  Lab Units 11/14/17  0855   INR  1 07       * I Have Reviewed All Lab Data Listed Above  * Additional Pertinent Lab Tests Reviewed: [facility]        Recent Cultures (last 7 days):       Results from last 7 days  Lab Units 11/14/17  1355 11/14/17  0707   INFLUENZA A PCR   --  None Detected   INFLUENZA B PCR   --  None Detected   RSV PCR   --  None Detected   C DIFF TOXIN B  NEGATIVE for C difficle toxin by PCR    --        Last 24 Hours Medication List:     lisinopril 20 mg Oral Daily        Today, Patient Was Seen By: Abimbola Varela MD    ** Please Note: This note has been constructed using a voice recognition system   **

## 2017-11-15 NOTE — PROGRESS NOTES
Armando Barker NP made aware of NA of 121 and asked if patient needed any supplementation or repeat labs every so many hours   Armando Barker read notes from prior practitioners and repeat lab work will be drawn in the am

## 2017-11-16 VITALS
HEART RATE: 82 BPM | SYSTOLIC BLOOD PRESSURE: 150 MMHG | OXYGEN SATURATION: 97 % | BODY MASS INDEX: 26.13 KG/M2 | HEIGHT: 72 IN | TEMPERATURE: 97.8 F | WEIGHT: 192.9 LBS | DIASTOLIC BLOOD PRESSURE: 90 MMHG | RESPIRATION RATE: 18 BRPM

## 2017-11-16 PROBLEM — R19.7 DIARRHEA: Status: RESOLVED | Noted: 2017-11-14 | Resolved: 2017-11-16

## 2017-11-16 PROBLEM — K52.9 ENTERITIS: Status: RESOLVED | Noted: 2017-11-14 | Resolved: 2017-11-16

## 2017-11-16 LAB
ANION GAP SERPL CALCULATED.3IONS-SCNC: 9 MMOL/L (ref 4–13)
ANISOCYTOSIS BLD QL SMEAR: PRESENT
BASOPHILS # BLD MANUAL: 0 THOUSAND/UL (ref 0–0.1)
BASOPHILS NFR MAR MANUAL: 0 % (ref 0–1)
BUN SERPL-MCNC: 4 MG/DL (ref 5–25)
CALCIUM SERPL-MCNC: 7.6 MG/DL (ref 8.3–10.1)
CHLORIDE SERPL-SCNC: 97 MMOL/L (ref 100–108)
CO2 SERPL-SCNC: 23 MMOL/L (ref 21–32)
CREAT SERPL-MCNC: 0.72 MG/DL (ref 0.6–1.3)
EOSINOPHIL # BLD MANUAL: 0.09 THOUSAND/UL (ref 0–0.4)
EOSINOPHIL NFR BLD MANUAL: 1 % (ref 0–6)
ERYTHROCYTE [DISTWIDTH] IN BLOOD BY AUTOMATED COUNT: 13.5 % (ref 11.6–15.1)
GFR SERPL CREATININE-BSD FRML MDRD: 104 ML/MIN/1.73SQ M
GLUCOSE SERPL-MCNC: 128 MG/DL (ref 65–140)
HCT VFR BLD AUTO: 34.6 % (ref 36.5–49.3)
HGB BLD-MCNC: 12.5 G/DL (ref 12–17)
LYMPHOCYTES # BLD AUTO: 2.31 THOUSAND/UL (ref 0.6–4.47)
LYMPHOCYTES # BLD AUTO: 26 % (ref 14–44)
MCH RBC QN AUTO: 28.7 PG (ref 26.8–34.3)
MCHC RBC AUTO-ENTMCNC: 36.1 G/DL (ref 31.4–37.4)
MCV RBC AUTO: 80 FL (ref 82–98)
MICROCYTES BLD QL AUTO: PRESENT
MONOCYTES # BLD AUTO: 0.71 THOUSAND/UL (ref 0–1.22)
MONOCYTES NFR BLD: 8 % (ref 4–12)
NEUTROPHILS # BLD MANUAL: 4.53 THOUSAND/UL (ref 1.85–7.62)
NEUTS BAND NFR BLD MANUAL: 1 % (ref 0–8)
NEUTS SEG NFR BLD AUTO: 50 % (ref 43–75)
PATHOLOGIST INTERPRETATION: NORMAL
PATHOLOGY REVIEW: YES
PLATELET # BLD AUTO: 83 THOUSANDS/UL (ref 149–390)
PLATELET BLD QL SMEAR: ABNORMAL
PMV BLD AUTO: 11.4 FL (ref 8.9–12.7)
POTASSIUM SERPL-SCNC: 3.2 MMOL/L (ref 3.5–5.3)
RBC # BLD AUTO: 4.35 MILLION/UL (ref 3.88–5.62)
RBC MORPH BLD: PRESENT
SODIUM SERPL-SCNC: 129 MMOL/L (ref 136–145)
TOTAL CELLS COUNTED SPEC: 100
VARIANT LYMPHS # BLD AUTO: 14 %
WBC # BLD AUTO: 8.88 THOUSAND/UL (ref 4.31–10.16)

## 2017-11-16 PROCEDURE — 85007 BL SMEAR W/DIFF WBC COUNT: CPT | Performed by: HOSPITALIST

## 2017-11-16 PROCEDURE — 85027 COMPLETE CBC AUTOMATED: CPT | Performed by: HOSPITALIST

## 2017-11-16 PROCEDURE — 80048 BASIC METABOLIC PNL TOTAL CA: CPT | Performed by: HOSPITALIST

## 2017-11-16 PROCEDURE — 85025 COMPLETE CBC W/AUTO DIFF WBC: CPT | Performed by: HOSPITALIST

## 2017-11-16 RX ORDER — POTASSIUM CHLORIDE 20 MEQ/1
40 TABLET, EXTENDED RELEASE ORAL ONCE
Status: COMPLETED | OUTPATIENT
Start: 2017-11-16 | End: 2017-11-16

## 2017-11-16 RX ADMIN — ACETAMINOPHEN 650 MG: 325 TABLET ORAL at 05:50

## 2017-11-16 RX ADMIN — POTASSIUM CHLORIDE 40 MEQ: 1500 TABLET, EXTENDED RELEASE ORAL at 08:39

## 2017-11-16 NOTE — CASE MANAGEMENT
Notification of Discharge  This is a Notification of Discharge from our facility [address]  Please be advised that this patient has been discharge from our facility  Below you will find the admission and discharge date and time including the patients disposition  PRESENTATION DATE: 11/14/2017  6:19 AM  IP ADMISSION DATE: 11/14/17 1011  DISCHARGE DATE: 11/16/2017 10:27 AM  DISPOSITION: Home/Self Care    [facility] in the [facility] by Barrington Clement for 2017  Network Utilization Review Department  Phone: [phone]; Fax [phone]  ATTENTION: The Network Utilization Review Department is now centralized for our 7 Facilities  Make a note that we have a new phone and fax numbers for our Department  Please call with any questions or concerns to [phone] and carefully follow the prompts so that you are directed to the right person  All voicemails are confidential  Fax any determinations, approvals, denials, and requests for initial or continue stay review clinical to [phone]  Due to HIGH CALL volume, it would be easier if you could please send faxed requests to expedite your requests and in part, help us provide discharge notifications faster

## 2017-11-16 NOTE — SOCIAL WORK
Patient is for discharge to home today  He does not feel that he needs VNA or any information on alcohol rehab    Given info on family/internal medicine doctors at patients request

## 2017-11-16 NOTE — PROGRESS NOTES
The lab called me to let me know that the patient had some atypical lymphocytes on his CBC  They are recommending flow cytometry  I have ordered this  I called the patient and left a message informing him of this

## 2017-11-16 NOTE — PROGRESS NOTES
Denies pain    No further diarrhea    Afebrile    bp slightly high this am but pt was anxious    Seen by dr Geetha Yip and pt is discharged  Destiny Baeza

## 2017-11-16 NOTE — DISCHARGE SUMMARY
Discharge Summary - [address] [facility]    Patient Information: Jimbo Lau 65 y o  male MRN: [id number]  Unit/Bed#: [address] [place] [address] Encounter: [id number]    Discharging Physician / Practitioner: Raman Chaney MD  PCP: Tony Mclean MD  Admission Date: 11/14/2017  Discharge Date: 11/16/17    Reason for Admission:  Viral gastroenteritis    Discharge Diagnoses:     Principal Problem (Resolved):    Diarrhea  Active Problems:    Acute hyponatremia    Thrombocytopenia ([address] )    Liver lesion  Resolved Problems:    Enteritis      Consultations During Hospital Stay:  · None    Procedures Performed:     · None    Significant Findings / Test Results:     · See narrative    Incidental Findings:   · None    Test Results Pending at Discharge (will require follow up): · None     Outpatient Tests Requested:  · BMP and CBC in 4 days    Complications:  None    Hospital Course:     Jimbo Lau is a 65 y o  male patient who originally presented to the hospital on 11/14/2017 due to diarrhea and abdominal pain as outlined in the H&P done at the time of admission  On admission the patient's sodium was 121  He had a urine osmolality of 622 and a serum osmolality that was low at two hundred fifty-six  Urine sodium was 29  These indices were consistent with hyponatremia due to the patient's history of alcohol abuse and volume depletion from diarrhea  The patient also had a history of tea and toast diet    With IV fluids the patient's sodium improved to 129  The patient was and is asymptomatic  From this standpoint the patient is medically stable for discharge  He will have a repeat BMP in 4 days  The patient's diarrhea and abdominal pain completely resolved in less than the 48 hours he was in the hospital with supportive care  Stool studies were notable for negative C diff toxin, negative salmonella, negative Shigella, negative Campylobacter, negative shiga toxin, negative rotavirus    The patient did have thrombocytopenia on admission with platelets of 49  At the time of discharge his platelets are 83  I believe this is due to alcohol abuse  The patient has been counseled on alcohol cessation  He has also been counseled on liberalizing his diet, most notably salt intake  I have stressed with the patient the importance of follow-up with his primary care physician in 4 days and he verbally acknowledges understanding of this  The patient is medically stable for discharge at this time  Condition at Discharge: good     Discharge Day Visit / Exam:     Subjective:  No acute complaints  Vitals: Blood Pressure: 150/90 (11/16/17 0734)  Pulse: 82 (11/16/17 0734)  Temperature: 97 8 °F (36 6 °C) (11/16/17 0734)  Temp Source: Oral (11/16/17 0734)  Respirations: 18 (11/16/17 0734)  Height: 6' (182 9 cm) (11/14/17 1107)  Weight - Scale: 87 5 kg (192 lb 14 4 oz) (11/14/17 1107)  SpO2: 97 % (11/16/17 0734)  Exam:   Physical Exam  Gen: NAD, AAOx3, well developed, well nourished  Eyes: EOMI, PERRLA, no scleral icterus  ENMT:  Oropharynx clear of erythema or exudates, no nasal discharge, no otic discharge, moist mucous membranes  Neck:  Supple  Lymph:  No anterior or posterior cervical or supraclavicular lymphadenopathy  Cardiovascular:  Regular rate and rhythm, normal S1-S2, no murmurs, rubs, or gallops  Lungs:  Clear to auscultation bilaterally, no wheezes, or rales, or rhonchi  Abdomen:  Positive bowel sounds, soft, nontender, nondistended, no palpable organomegaly   Skin:  Intact, no obvious lesions or rashes, no edema  Neuro: Cranial nerves 2-12 are intact, non-focal, 5/5 strength in all 4 extremities    Discharge instructions/Information to patient and family:   See after visit summary for information provided to patient and family  Provisions for Follow-Up Care:  See after visit summary for information related to follow-up care and any pertinent home health orders        Disposition:     Home    For Discharges to [address] [place] [address]  [facility]:   · Not Applicable to this Patient - Not Applicable to this Patient    Planned Readmission: None     Discharge Statement:  I spent 31 minutes discharging the patient  This time was spent on the day of discharge  I had direct contact with the patient on the day of discharge  Greater than 50% of the total time was spent examining patient, answering all patient questions, arranging and discussing plan of care with patient as well as directly providing post-discharge instructions  Additional time then spent on discharge activities  Discharge Medications:  See after visit summary for reconciled discharge medications provided to patient and family        ** Please Note: This note has been constructed using a voice recognition system **

## 2017-11-17 LAB — WBC SPEC QL GRAM STN: NORMAL

## 2017-11-19 LAB
BACTERIA BLD CULT: NORMAL
BACTERIA BLD CULT: NORMAL

## 2017-11-20 ENCOUNTER — APPOINTMENT (OUTPATIENT)
Dept: LAB | Facility: CLINIC | Age: 57
End: 2017-11-20
Payer: COMMERCIAL

## 2017-11-20 DIAGNOSIS — D69.6 THROMBOCYTOPENIA (HCC): ICD-10-CM

## 2017-11-20 DIAGNOSIS — E87.1 ACUTE HYPONATREMIA: ICD-10-CM

## 2017-11-20 LAB
ALBUMIN SERPL BCP-MCNC: 3.3 G/DL (ref 3.5–5)
ALP SERPL-CCNC: 84 U/L (ref 46–116)
ALT SERPL W P-5'-P-CCNC: 240 U/L (ref 12–78)
ANION GAP SERPL CALCULATED.3IONS-SCNC: 6 MMOL/L (ref 4–13)
AST SERPL W P-5'-P-CCNC: 73 U/L (ref 5–45)
BILIRUB SERPL-MCNC: 0.74 MG/DL (ref 0.2–1)
BUN SERPL-MCNC: 13 MG/DL (ref 5–25)
CALCIUM SERPL-MCNC: 8.9 MG/DL (ref 8.3–10.1)
CHLORIDE SERPL-SCNC: 100 MMOL/L (ref 100–108)
CO2 SERPL-SCNC: 27 MMOL/L (ref 21–32)
CREAT SERPL-MCNC: 0.96 MG/DL (ref 0.6–1.3)
ERYTHROCYTE [DISTWIDTH] IN BLOOD BY AUTOMATED COUNT: 14.1 % (ref 11.6–15.1)
GFR SERPL CREATININE-BSD FRML MDRD: 88 ML/MIN/1.73SQ M
GLUCOSE SERPL-MCNC: 96 MG/DL (ref 65–140)
HCT VFR BLD AUTO: 37.1 % (ref 36.5–49.3)
HGB BLD-MCNC: 12.9 G/DL (ref 12–17)
MCH RBC QN AUTO: 28.5 PG (ref 26.8–34.3)
MCHC RBC AUTO-ENTMCNC: 34.8 G/DL (ref 31.4–37.4)
MCV RBC AUTO: 82 FL (ref 82–98)
PLATELET # BLD AUTO: 483 THOUSANDS/UL (ref 149–390)
PMV BLD AUTO: 9 FL (ref 8.9–12.7)
POTASSIUM SERPL-SCNC: 4.2 MMOL/L (ref 3.5–5.3)
PROT SERPL-MCNC: 7.4 G/DL (ref 6.4–8.2)
RBC # BLD AUTO: 4.53 MILLION/UL (ref 3.88–5.62)
SODIUM SERPL-SCNC: 133 MMOL/L (ref 136–145)
WBC # BLD AUTO: 8.66 THOUSAND/UL (ref 4.31–10.16)

## 2017-11-20 PROCEDURE — 36415 COLL VENOUS BLD VENIPUNCTURE: CPT

## 2017-11-20 PROCEDURE — 85027 COMPLETE CBC AUTOMATED: CPT

## 2017-11-20 PROCEDURE — 80053 COMPREHEN METABOLIC PANEL: CPT

## 2019-08-24 ENCOUNTER — APPOINTMENT (OUTPATIENT)
Dept: LAB | Facility: CLINIC | Age: 59
End: 2019-08-24
Payer: COMMERCIAL

## 2019-08-24 ENCOUNTER — TRANSCRIBE ORDERS (OUTPATIENT)
Dept: ADMINISTRATIVE | Facility: HOSPITAL | Age: 59
End: 2019-08-24

## 2019-08-24 DIAGNOSIS — N13.8 ENLARGED PROSTATE WITH URINARY OBSTRUCTION: Primary | ICD-10-CM

## 2019-08-24 DIAGNOSIS — Z80.42 FAMILY HISTORY OF MALIGNANT NEOPLASM OF PROSTATE: ICD-10-CM

## 2019-08-24 DIAGNOSIS — N40.1 ENLARGED PROSTATE WITH URINARY OBSTRUCTION: Primary | ICD-10-CM

## 2019-08-24 DIAGNOSIS — N13.8 ENLARGED PROSTATE WITH URINARY OBSTRUCTION: ICD-10-CM

## 2019-08-24 DIAGNOSIS — N40.1 ENLARGED PROSTATE WITH URINARY OBSTRUCTION: ICD-10-CM

## 2019-08-24 LAB — PSA SERPL-MCNC: 2.2 NG/ML (ref 0–4)

## 2019-08-24 PROCEDURE — 84153 ASSAY OF PSA TOTAL: CPT

## 2023-08-07 ENCOUNTER — APPOINTMENT (OUTPATIENT)
Dept: RADIOLOGY | Facility: HOSPITAL | Age: 63
End: 2023-08-07
Payer: COMMERCIAL

## 2023-08-07 ENCOUNTER — HOSPITAL ENCOUNTER (EMERGENCY)
Facility: HOSPITAL | Age: 63
Discharge: HOME | End: 2023-08-07
Attending: EMERGENCY MEDICINE
Payer: COMMERCIAL

## 2023-08-07 ENCOUNTER — OFFICE VISIT (OUTPATIENT)
Dept: URGENT CARE | Facility: CLINIC | Age: 63
End: 2023-08-07
Payer: COMMERCIAL

## 2023-08-07 VITALS
OXYGEN SATURATION: 98 % | DIASTOLIC BLOOD PRESSURE: 93 MMHG | WEIGHT: 192 LBS | HEIGHT: 72 IN | RESPIRATION RATE: 18 BRPM | BODY MASS INDEX: 26.01 KG/M2 | SYSTOLIC BLOOD PRESSURE: 136 MMHG | HEART RATE: 85 BPM

## 2023-08-07 VITALS
BODY MASS INDEX: 26.04 KG/M2 | HEART RATE: 73 BPM | DIASTOLIC BLOOD PRESSURE: 79 MMHG | WEIGHT: 192 LBS | SYSTOLIC BLOOD PRESSURE: 136 MMHG | OXYGEN SATURATION: 98 % | TEMPERATURE: 98 F | RESPIRATION RATE: 20 BRPM

## 2023-08-07 DIAGNOSIS — R06.02 SHORTNESS OF BREATH: ICD-10-CM

## 2023-08-07 DIAGNOSIS — R07.89 INTERMITTENT LEFT-SIDED CHEST PAIN: Primary | ICD-10-CM

## 2023-08-07 DIAGNOSIS — R07.9 CHEST PAIN, UNSPECIFIED TYPE: Primary | ICD-10-CM

## 2023-08-07 DIAGNOSIS — R06.09 DYSPNEA ON EXERTION: ICD-10-CM

## 2023-08-07 DIAGNOSIS — E87.1 HYPONATREMIA: ICD-10-CM

## 2023-08-07 LAB
2HR DELTA HS TROPONIN: -1 NG/L
ALBUMIN SERPL BCP-MCNC: 4.6 G/DL (ref 3.5–5)
ALP SERPL-CCNC: 72 U/L (ref 34–104)
ALT SERPL W P-5'-P-CCNC: 12 U/L (ref 7–52)
ANION GAP SERPL CALCULATED.3IONS-SCNC: 7 MMOL/L
AST SERPL W P-5'-P-CCNC: 11 U/L (ref 13–39)
BASOPHILS # BLD AUTO: 0.06 THOUSANDS/ÂΜL (ref 0–0.1)
BASOPHILS NFR BLD AUTO: 1 % (ref 0–1)
BILIRUB SERPL-MCNC: 0.78 MG/DL (ref 0.2–1)
BUN SERPL-MCNC: 20 MG/DL (ref 5–25)
CALCIUM SERPL-MCNC: 9.7 MG/DL (ref 8.4–10.2)
CARDIAC TROPONIN I PNL SERPL HS: 4 NG/L
CARDIAC TROPONIN I PNL SERPL HS: 5 NG/L
CHLORIDE SERPL-SCNC: 95 MMOL/L (ref 96–108)
CO2 SERPL-SCNC: 26 MMOL/L (ref 21–32)
CREAT SERPL-MCNC: 0.95 MG/DL (ref 0.6–1.3)
D DIMER PPP FEU-MCNC: 0.29 UG/ML FEU
EOSINOPHIL # BLD AUTO: 0.19 THOUSAND/ÂΜL (ref 0–0.61)
EOSINOPHIL NFR BLD AUTO: 1 % (ref 0–6)
ERYTHROCYTE [DISTWIDTH] IN BLOOD BY AUTOMATED COUNT: 12.3 % (ref 11.6–15.1)
GFR SERPL CREATININE-BSD FRML MDRD: 85 ML/MIN/1.73SQ M
GLUCOSE SERPL-MCNC: 107 MG/DL (ref 65–140)
HCT VFR BLD AUTO: 42.7 % (ref 36.5–49.3)
HGB BLD-MCNC: 14.6 G/DL (ref 12–17)
IMM GRANULOCYTES # BLD AUTO: 0.06 THOUSAND/UL (ref 0–0.2)
IMM GRANULOCYTES NFR BLD AUTO: 1 % (ref 0–2)
LYMPHOCYTES # BLD AUTO: 2 THOUSANDS/ÂΜL (ref 0.6–4.47)
LYMPHOCYTES NFR BLD AUTO: 15 % (ref 14–44)
MCH RBC QN AUTO: 28.9 PG (ref 26.8–34.3)
MCHC RBC AUTO-ENTMCNC: 34.2 G/DL (ref 31.4–37.4)
MCV RBC AUTO: 85 FL (ref 82–98)
MONOCYTES # BLD AUTO: 1.25 THOUSAND/ÂΜL (ref 0.17–1.22)
MONOCYTES NFR BLD AUTO: 9 % (ref 4–12)
NEUTROPHILS # BLD AUTO: 9.67 THOUSANDS/ÂΜL (ref 1.85–7.62)
NEUTS SEG NFR BLD AUTO: 73 % (ref 43–75)
NRBC BLD AUTO-RTO: 0 /100 WBCS
PLATELET # BLD AUTO: 259 THOUSANDS/UL (ref 149–390)
PMV BLD AUTO: 8.3 FL (ref 8.9–12.7)
POTASSIUM SERPL-SCNC: 4.7 MMOL/L (ref 3.5–5.3)
PROT SERPL-MCNC: 7.8 G/DL (ref 6.4–8.4)
RBC # BLD AUTO: 5.05 MILLION/UL (ref 3.88–5.62)
SODIUM SERPL-SCNC: 128 MMOL/L (ref 135–147)
WBC # BLD AUTO: 13.23 THOUSAND/UL (ref 4.31–10.16)

## 2023-08-07 PROCEDURE — 93005 ELECTROCARDIOGRAM TRACING: CPT

## 2023-08-07 PROCEDURE — 84484 ASSAY OF TROPONIN QUANT: CPT | Performed by: EMERGENCY MEDICINE

## 2023-08-07 PROCEDURE — 85025 COMPLETE CBC W/AUTO DIFF WBC: CPT | Performed by: EMERGENCY MEDICINE

## 2023-08-07 PROCEDURE — 71046 X-RAY EXAM CHEST 2 VIEWS: CPT

## 2023-08-07 PROCEDURE — 85379 FIBRIN DEGRADATION QUANT: CPT | Performed by: EMERGENCY MEDICINE

## 2023-08-07 PROCEDURE — 99285 EMERGENCY DEPT VISIT HI MDM: CPT

## 2023-08-07 PROCEDURE — 36415 COLL VENOUS BLD VENIPUNCTURE: CPT

## 2023-08-07 PROCEDURE — 80053 COMPREHEN METABOLIC PANEL: CPT | Performed by: EMERGENCY MEDICINE

## 2023-08-07 PROCEDURE — 99213 OFFICE O/P EST LOW 20 MIN: CPT | Performed by: PHYSICIAN ASSISTANT

## 2023-08-07 RX ORDER — IVERMECTIN 3 MG/1
200 TABLET ORAL ONCE
COMMUNITY

## 2023-08-07 RX ORDER — LISINOPRIL AND HYDROCHLOROTHIAZIDE 20; 12.5 MG/1; MG/1
1 TABLET ORAL DAILY
COMMUNITY
Start: 2023-06-16

## 2023-08-07 NOTE — PATIENT INSTRUCTIONS
Chest Pain   WHAT YOU NEED TO KNOW:   Chest pain can be caused by a range of conditions, from not serious to life-threatening. Chest pain can be a symptom of a digestive problem, such as acid reflux or a stomach ulcer. An anxiety attack or a strong emotion, such as anger, can also cause chest pain. Infection, inflammation, or a fracture in the bones or cartilage in your chest can cause pain or discomfort. Sometimes chest pain or pressure is caused by poor blood flow to your heart (angina). Chest pain may also be caused by life-threatening conditions such as a heart attack or blood clot in your lungs. DISCHARGE INSTRUCTIONS:   Call your local emergency number (911 in the [address]) or have someone call if:   You have any of the following signs of a heart attack:      Squeezing, pressure, or pain in your chest    You may  also have any of the following:     Discomfort or pain in your back, neck, jaw, stomach, or arm    Shortness of breath    Nausea or vomiting    Lightheadedness or a sudden cold sweat      Return to the emergency department if:   You have chest discomfort that gets worse, even with medicine. You cough or vomit blood. Your bowel movements are black or bloody. You cannot stop vomiting, or it hurts to swallow. Call your doctor if:   You have questions or concerns about your condition or care. Medicines:   Medicines  may be given to treat the cause of your chest pain. Examples include pain medicine, anxiety medicine, or medicines to increase blood flow to your heart. Do not take certain medicines without asking your healthcare provider first.  These include NSAIDs, herbal or vitamin supplements, and hormones, such as estrogen or progestin. Take your medicine as directed. Contact your healthcare provider if you think your medicine is not helping or if you have side effects. Tell your provider if you are allergic to any medicine. Keep a list of the medicines, vitamins, and herbs you take. Include the amounts, and when and why you take them. Bring the list or the pill bottles to follow-up visits. Carry your medicine list with you in case of an emergency. Healthy living tips: If the cause of your chest pain is known, your healthcare provider will give you specific guidelines to follow. The following are general healthy guidelines:  Do not smoke. Nicotine and other chemicals in cigarettes and cigars can cause lung and heart damage. Ask your healthcare provider for information if you currently smoke and need help to quit. E-cigarettes or smokeless tobacco still contain nicotine. Talk to your healthcare provider before you use these products. Choose a variety of healthy foods as often as possible. Include fresh, frozen, or canned fruits and vegetables. Also include low-fat dairy products, fish, chicken (without skin), and lean meats. Your healthcare provider or a dietitian can help you create meal plans. You may need to avoid certain foods or drinks if your pain is caused by a digestion problem. Lower your sodium (salt) intake. Limit foods that are high in sodium, such as canned foods, salty snacks, and cold cuts. If you add salt when you cook food, do not add more at the table. Choose low-sodium canned foods as much as possible. Drink plenty of water every day. Water helps your body to control your temperature and blood pressure. Ask your healthcare provider how much water you should drink every day. Ask about activity. Your healthcare provider will tell you which activities to limit or avoid. Ask when you can drive, return to work, and have sex. Ask about the best exercise plan for you. Maintain a healthy weight. Ask your healthcare provider what a healthy weight is for you. Ask him or her to help you create a safe weight loss plan if you are overweight. Ask about vaccines you may need.   Your healthcare provider can tell you which vaccines you need, and when to get them. The following vaccines help prevent diseases that can become serious for a person with a heart condition:    The influenza (flu) vaccine is given each year. Get a flu vaccine as soon as recommended, usually in September or October. The pneumonia vaccine is usually given every 5 years. Your healthcare provider may recommend the pneumonia vaccine if you are 72 or older. COVID-19 vaccines are given to adults as a shot in 1 or 2 doses. Vaccination is recommended for all adults. A booster (additional) dose is also recommended for all adults. A second booster is recommended for all adults 48 or older and for immunocompromised adults 25 or older. The second booster is also recommended for adults who received the 1-dose vaccine for the first and booster doses. Your healthcare provider can tell you when to get one or both boosters. Follow up with your doctor within 72 hours, or as directed: You may need to return for more tests to find the cause of your chest pain. You may be referred to a specialist, such as a cardiologist or gastroenterologist. Write down your questions so you remember to ask them during your visits. © Copyright Abhinav Reyes 2022 Information is for End User's use only and may not be sold, redistributed or otherwise used for commercial purposes. The above information is an  only. It is not intended as medical advice for individual conditions or treatments. Talk to your doctor, nurse or pharmacist before following any medical regimen to see if it is safe and effective for you.

## 2023-08-07 NOTE — ED NOTES
Patient yelled at this RN stating "you need to check me out, this is not a functioning hospital"  . Provider aware. Upon arrival back to pts room, patient and family were gone.       Danisha Germain RN  08/07/23 5446

## 2023-08-07 NOTE — ED PROVIDER NOTES
History  Chief Complaint   Patient presents with   • Chest Pain     Pt to ED from urgent care for chest pain. Pt states he does have some SOB, pain comes and goes, worse when coughing     81-year-old male presents from urgent care for further evaluation of intermittent left-sided chest pain with some associated shortness of breath that has been ongoing for about a week. The patient states that he has left-sided chest pain with coughing. Patient denies productive cough. The patient states that recently he was cleaning out his sister's house which had multiple environmental allergens which may have contributed to his cough. The patient states that he is getting fatigued when walking up stairs at work although the [address] [place] [address] Road has worse air quality than his house. The patient states what he has at rest he is not short of breath. The patient denies any recent travel leg pain or swelling. Prior to Admission Medications   Prescriptions Last Dose Informant Patient Reported? Taking? ivermectin (STROMECTOL) 3 MG TABS   Yes No   Sig: Take 200 mcg/kg by mouth once   lisinopril (ZESTRIL) 20 mg tablet   Yes No   Sig: Take 20 mg by mouth daily   Patient not taking: Reported on 8/7/2023   lisinopril-hydrochlorothiazide (PRINZIDE,ZESTORETIC) 20-12.5 MG per tablet   Yes No   Sig: Take 1 tablet by mouth daily      Facility-Administered Medications: None       Past Medical History:   Diagnosis Date   • BPH (benign prostatic hyperplasia)    • Hypertension        History reviewed. No pertinent surgical history. History reviewed. No pertinent family history. I have reviewed and agree with the history as documented. E-Cigarette/Vaping     E-Cigarette/Vaping Substances     Social History     Tobacco Use   • Smoking status: Never   • Smokeless tobacco: Never   Substance Use Topics   • Alcohol use: Yes     Alcohol/week: 2.0 standard drinks of alcohol     Types: 2 Glasses of wine per week     Comment: a day. . last consumption thursday   • Drug use: No       Review of Systems    Physical Exam  Physical Exam  Vitals and nursing note reviewed. Constitutional:       General: He is not in acute distress. Appearance: He is well-developed. HENT:      Head: Normocephalic and atraumatic. Right Ear: External ear normal.      Left Ear: External ear normal.      Mouth/Throat:      Pharynx: No oropharyngeal exudate. Eyes:      General: No scleral icterus. Pupils: Pupils are equal, round, and reactive to light. Cardiovascular:      Rate and Rhythm: Normal rate and regular rhythm. Heart sounds: Normal heart sounds. Pulmonary:      Effort: Pulmonary effort is normal. No respiratory distress. Breath sounds: Normal breath sounds. Abdominal:      General: Bowel sounds are normal.      Palpations: Abdomen is soft. Tenderness: There is no abdominal tenderness. There is no guarding or rebound. Musculoskeletal:         General: Normal range of motion. Cervical back: Normal range of motion and neck supple. Skin:     General: Skin is warm and dry. Findings: No rash. Neurological:      Mental Status: He is alert and oriented to person, place, and time.          Vital Signs  ED Triage Vitals   Temperature Pulse Respirations Blood Pressure SpO2   08/07/23 1349 08/07/23 1349 08/07/23 1349 08/07/23 1349 08/07/23 1349   98 °F (36.7 °C) 76 18 164/96 98 %      Temp src Heart Rate Source Patient Position - Orthostatic VS BP Location FiO2 (%)   -- 08/07/23 1500 08/07/23 1349 08/07/23 1349 --    Monitor Sitting Left arm       Pain Score       08/07/23 1349       3           Vitals:    08/07/23 1349 08/07/23 1500   BP: 164/96 136/79   Pulse: 76 73   Patient Position - Orthostatic VS: Sitting Lying         Visual Acuity      ED Medications  Medications - No data to display    Diagnostic Studies  Results Reviewed     Procedure Component Value Units Date/Time    HS Troponin I 2hr [[id number]]  (Normal) Collected: 08/07/23 1542    Lab Status: Final result Specimen: Blood from Arm, Left Updated: 08/07/23 1637     hs TnI 2hr 4 ng/L      Delta 2hr hsTnI -1 ng/L     D-Dimer [[id number]] Collected: 08/07/23 1542    Lab Status:  In process Specimen: Blood from Arm, Left Updated: 08/07/23 1546    HS Troponin 0hr (reflex protocol) [[id number]]  (Normal) Collected: 08/07/23 1354    Lab Status: Final result Specimen: Blood from Arm, Left Updated: 08/07/23 1438     hs TnI 0hr 5 ng/L     Comprehensive metabolic panel [[id number]]  (Abnormal) Collected: 08/07/23 1354    Lab Status: Final result Specimen: Blood from Arm, Left Updated: 08/07/23 1423     Sodium 128 mmol/L      Potassium 4.7 mmol/L      Chloride 95 mmol/L      CO2 26 mmol/L      ANION GAP 7 mmol/L      BUN 20 mg/dL      Creatinine 0.95 mg/dL      Glucose 107 mg/dL      Calcium 9.7 mg/dL      AST 11 U/L      ALT 12 U/L      Alkaline Phosphatase 72 U/L      Total Protein 7.8 g/dL      Albumin 4.6 g/dL      Total Bilirubin 0.78 mg/dL      eGFR 85 ml/min/1.73sq m     Narrative:      [place] guidelines for Chronic Kidney Disease (CKD):   •  Stage 1 with normal or high GFR (GFR > 90 mL/min/1.73 square meters)  •  Stage 2 Mild CKD (GFR = 60-89 mL/min/1.73 square meters)  •  Stage 3A Moderate CKD (GFR = 45-59 mL/min/1.73 square meters)  •  Stage 3B Moderate CKD (GFR = 30-44 mL/min/1.73 square meters)  •  Stage 4 Severe CKD (GFR = 15-29 mL/min/1.73 square meters)  •  Stage 5 End Stage CKD (GFR <15 mL/min/1.73 square meters)  Note: GFR calculation is accurate only with a steady state creatinine    CBC and differential [[id number]]  (Abnormal) Collected: 08/07/23 1354    Lab Status: Final result Specimen: Blood from Arm, Left Updated: 08/07/23 1400     WBC 13.23 Thousand/uL      RBC 5.05 Million/uL      Hemoglobin 14.6 g/dL      Hematocrit 42.7 %      MCV 85 fL      MCH 28.9 pg      MCHC 34.2 g/dL      RDW 12.3 %      MPV 8.3 fL      Platelets 320 Thousands/uL nRBC 0 /100 WBCs      Neutrophils Relative 73 %      Immat GRANS % 1 %      Lymphocytes Relative 15 %      Monocytes Relative 9 %      Eosinophils Relative 1 %      Basophils Relative 1 %      Neutrophils Absolute 9.67 Thousands/µL      Immature Grans Absolute 0.06 Thousand/uL      Lymphocytes Absolute 2.00 Thousands/µL      Monocytes Absolute 1.25 Thousand/µL      Eosinophils Absolute 0.19 Thousand/µL      Basophils Absolute 0.06 Thousands/µL                  XR chest 2 views   Final Result by Lisa Sorensen MD (08/07 1430)      No acute cardiopulmonary disease. Workstation performed: [id number]                    Procedures  ECG 12 Lead Documentation Only    Date/Time: 8/7/2023 4:07 PM    Performed by: David Max DO  Authorized by: David Max DO    Indications / Diagnosis:  Chest pain, shortness of breath  ECG reviewed by me, the ED Provider: yes    Patient location:  ED  Previous ECG:     Previous ECG:  Unavailable  Interpretation:     Interpretation: normal    Rate:     ECG rate:  72    ECG rate assessment: normal    Rhythm:     Rhythm: sinus rhythm    Ectopy:     Ectopy: none    QRS:     QRS axis:  Normal    QRS intervals:  Normal  Conduction:     Conduction: normal    ST segments:     ST segments:  Normal  T waves:     T waves: normal               ED Course  ED Course as of 08/07/23 2050   Mon Aug 07, 2023   1647 Patient left the department prior to completion of service [address] [place] [address] with D-dimer pending.   Patient left prior to signing [address] [place] [address] paperwork or receiving any further discharge instructions             HEART Risk Score    Flowsheet Row Most Recent Value   Heart Score Risk Calculator    History 0 Filed at: 08/07/2023 1614   ECG 0 Filed at: 08/07/2023 1614   Age 1 Filed at: 08/07/2023 1614   Risk Factors 1 Filed at: 08/07/2023 1614   Troponin 0 Filed at: 08/07/2023 1614   HEART Score 2 Filed at: 08/07/2023 1614                        SBIRT 22yo+    Flowsheet Row Most Recent Value   Initial Alcohol Screen: US AUDIT-C     1. How often do you have a drink containing alcohol? 0 Filed at: 08/07/2023 1351   2. How many drinks containing alcohol do you have on a typical day you are drinking? 0 Filed at: 08/07/2023 1351   3a. Male UNDER 65: How often do you have five or more drinks on one occasion? 0 Filed at: 08/07/2023 1351   3b. FEMALE Any Age, or MALE 65+: How often do you have 4 or more drinks on one occassion? 0 Filed at: 08/07/2023 1351   Audit-C Score 0 Filed at: 08/07/2023 1351   JUANCARLOS: How many times in the past year have you. .. Used an illegal drug or used a prescription medication for non-medical reasons? Never Filed at: 08/07/2023 1351                    Medical Decision Making  Differential diagnosis: Arrhythmia, anemia, pneumonia, pneumothorax, pulmonary embolism, doubt myocardial infarction    Amount and/or Complexity of Data Reviewed  External Data Reviewed: notes. Details: urgent care note reviewed  Labs: ordered. Radiology: ordered. Disposition  Final diagnoses:   Intermittent left-sided chest pain   Hyponatremia   Dyspnea on exertion     Time reflects when diagnosis was documented in both MDM as applicable and the Disposition within this note     Time User Action Codes Description Comment    8/7/2023  4:13 PM Khadijah Cashing Add [R07.89] Intermittent left-sided chest pain     8/7/2023  4:14 PM Khadijah Cashing Add [E87.1] Hyponatremia     8/7/2023  4:14 PM Khadijah Cashing Add [R06.09] Dyspnea on exertion       ED Disposition     ED Disposition   AMA    Condition   --    Date/Time   Mon Aug 7, 2023  4:47 PM    Comment   Date: 8/7/2023  Patient: Marquez Reaves  Admitted: 8/7/2023  2:41 PM  Attending Provider: Suraj Mercado DO    Marquez Reaves or his authorized caregiver has made the decision for the patient to leave the emergency department against the advic e of the emergency department staff.  He or his authorized caregiver has been informed and understands the inherent risks, including death, pulmonary emoblism, loss of current lifestyle. He or his authorized caregiver has decided to accept the respon sibility for this decision. Sharon Reddy and all necessary parties have been advised that he may return for further evaluation or treatment. His condition at time of discharge was stable. Sharon Reddy had current vital signs as follows:  BP 13 6/79 (BP Location: Left arm)   Pulse 73   Temp 98 °F (36.7 °C)   Resp 20   Wt 87.1 kg (192 lb)            Follow-up Information     Follow up With Specialties Details Why Contact Info    Yobani Vásquez MD Family Medicine Schedule an appointment as soon as possible for a visit  For further evaluation [address]  [phone]            Discharge Medication List as of 8/7/2023  4:47 PM      CONTINUE these medications which have NOT CHANGED    Details   ivermectin (STROMECTOL) 3 MG TABS Take 200 mcg/kg by mouth once, Historical Med      lisinopril-hydrochlorothiazide (PRINZIDE,ZESTORETIC) 20-12.5 MG per tablet Take 1 tablet by mouth daily, Starting Fri 6/16/2023, Historical Med      lisinopril (ZESTRIL) 20 mg tablet Take 20 mg by mouth daily, Historical Med             No discharge procedures on file.     PDMP Review     None          ED Provider  Electronically Signed by           Torrey Tovar DO  08/07/23 2050

## 2023-08-07 NOTE — ED NOTES
Patient was dressed in his street clothes, ringing his call bell, stating:  "I wanna check out. This is not a functioning hospital.  A D-Dimer should have been ordered initially. I'm ready to go."  Patient was directed towards the door and walked out.        Katie Carvalho RN  08/07/23 7595

## 2023-08-12 LAB
ATRIAL RATE: 72 BPM
P AXIS: 77 DEGREES
PR INTERVAL: 146 MS
QRS AXIS: 79 DEGREES
QRSD INTERVAL: 76 MS
QT INTERVAL: 394 MS
QTC INTERVAL: 431 MS
T WAVE AXIS: 73 DEGREES
VENTRICULAR RATE: 72 BPM

## 2023-08-12 PROCEDURE — 93010 ELECTROCARDIOGRAM REPORT: CPT | Performed by: INTERNAL MEDICINE

## 2023-08-28 ENCOUNTER — OFFICE VISIT (OUTPATIENT)
Dept: FAMILY MEDICINE CLINIC | Facility: CLINIC | Age: 63
End: 2023-08-28
Payer: COMMERCIAL

## 2023-08-28 VITALS
BODY MASS INDEX: 25.14 KG/M2 | RESPIRATION RATE: 20 BRPM | DIASTOLIC BLOOD PRESSURE: 68 MMHG | SYSTOLIC BLOOD PRESSURE: 128 MMHG | HEART RATE: 77 BPM | WEIGHT: 185.6 LBS | HEIGHT: 72 IN | OXYGEN SATURATION: 98 % | TEMPERATURE: 98.3 F

## 2023-08-28 DIAGNOSIS — Z80.42 FAMILY HISTORY OF PROSTATE CANCER: ICD-10-CM

## 2023-08-28 DIAGNOSIS — Z11.4 ENCOUNTER FOR SCREENING FOR HIV: ICD-10-CM

## 2023-08-28 DIAGNOSIS — Z12.5 SCREENING FOR PROSTATE CANCER: ICD-10-CM

## 2023-08-28 DIAGNOSIS — E87.1 ACUTE HYPONATREMIA: ICD-10-CM

## 2023-08-28 DIAGNOSIS — Z11.59 ENCOUNTER FOR HEPATITIS C SCREENING TEST FOR LOW RISK PATIENT: ICD-10-CM

## 2023-08-28 DIAGNOSIS — N40.1 HYPERTROPHY OF PROSTATE WITH URINARY OBSTRUCTION: ICD-10-CM

## 2023-08-28 DIAGNOSIS — N13.8 HYPERTROPHY OF PROSTATE WITH URINARY OBSTRUCTION: ICD-10-CM

## 2023-08-28 DIAGNOSIS — I10 PRIMARY HYPERTENSION: Primary | ICD-10-CM

## 2023-08-28 PROCEDURE — 99204 OFFICE O/P NEW MOD 45 MIN: CPT | Performed by: FAMILY MEDICINE

## 2023-08-28 RX ORDER — LISINOPRIL 20 MG/1
20 TABLET ORAL 2 TIMES DAILY
Qty: 180 TABLET | Refills: 0 | Status: SHIPPED | OUTPATIENT
Start: 2023-08-28

## 2023-08-28 RX ORDER — LOSARTAN POTASSIUM 100 MG/1
TABLET ORAL
COMMUNITY
Start: 2023-08-05 | End: 2023-08-28 | Stop reason: ALTCHOICE

## 2023-08-28 NOTE — PROGRESS NOTES
Juanis Issa 1960 male MRN: [id number]    Family Medicine New Patient    ASSESSMENT/PLAN  Problem List Items Addressed This Visit        Cardiovascular and Mediastinum    Essential hypertension - Primary    Relevant Medications    lisinopril (ZESTRIL) 20 mg tablet       Genitourinary    Hypertrophy of prostate with urinary obstruction       Other    Acute hyponatremia    Relevant Orders    Comprehensive metabolic panel    Family history of prostate cancer    Relevant Orders    PSA, Total Screen    BMI 25.0-25.9,adult     BMI Counseling: Body mass index is 25.38 kg/m². The BMI is above normal. Nutrition recommendations include reducing portion sizes, decreasing overall calorie intake and 3-5 servings of fruits/vegetables daily. Exercise recommendations include vigorous aerobic physical activity for 75 minutes/week. Relevant Orders    Lipid panel    Comprehensive metabolic panel    CBC and differential    TSH, 3rd generation with Free T4 reflex    UA (URINE) with reflex to Scope   Other Visit Diagnoses     Encounter for screening for HIV        Relevant Orders    HIV 1/2 AG/AB W REFLEX [facility] and [facility] only    Encounter for hepatitis C screening test for low risk patient        Relevant Orders    Hepatitis C Ab W/Refl To HCV RNA, Qn, PCR    Screening for prostate cancer        Relevant Orders    PSA, Total Screen          Jennifer Venegas was here today to establish care. He reports a history of HTN and issues with medication. He was dissatisfied with his last pcp and the medications they prescribed. He has had low sodium which was likely due in part to diuretic use. We will monitor him on lisinopril for now and recheck labs to see if this improves. If not he will need a full workup for the hyponatremia. He is interested in natural as much as possible and states he will not be taking any vaccinations especially covid vaccine. We will have him get labs done and follow up for CP in 6 weeks.  He will call sooner if any concerns. Future Appointments   Date Time Provider 4600  [address]   10/13/2023  2:15 PM DO [facility]          SUBJECTIVE  CC: Establish care (Has been having bp issues )      HPI:  Maury Fleming is a 59 y.o. male who presents for new patient visit. He has a history of HTN and is on medication. Dose red beat juice as well  He is on lisinopril  Started an OTC supplement as well     HPI    Review of Systems   Constitutional: Negative for chills and fever. HENT: Negative for congestion, postnasal drip, rhinorrhea and sinus pain. Eyes: Negative for photophobia and visual disturbance. Respiratory: Negative for cough and shortness of breath. Cardiovascular: Negative for chest pain, palpitations and leg swelling. Gastrointestinal: Negative for abdominal pain, constipation, diarrhea, nausea and vomiting. Genitourinary: Negative for difficulty urinating and dysuria. Musculoskeletal: Negative for arthralgias and myalgias. Skin: Negative for rash. Neurological: Negative for dizziness and syncope. Historical Information   The patient history was reviewed as follows:    Past Medical History:   Diagnosis Date   • BPH (benign prostatic hyperplasia)    • Hypertension      History reviewed. No pertinent surgical history. History reviewed. No pertinent family history. Social History   Social History     Substance and Sexual Activity   Alcohol Use Yes   • Alcohol/week: 2.0 standard drinks of alcohol   • Types: 2 Glasses of wine per week    Comment: a day. . last consumption thursday     Social History     Substance and Sexual Activity   Drug Use No     Social History     Tobacco Use   Smoking Status Never   Smokeless Tobacco Never       Medications:     Current Outpatient Medications:   •  lisinopril (ZESTRIL) 20 mg tablet, Take 1 tablet (20 mg total) by mouth 2 (two) times a day, Disp: 180 tablet, Rfl: 0  •  ivermectin (STROMECTOL) 3 MG TABS, Take 200 mcg/kg by mouth once (Patient not taking: Reported on 8/28/2023), Disp: , Rfl:   Allergies   Allergen Reactions   • Ephraim Grass Pollen Allergen Hives       OBJECTIVE    Vitals:   Vitals:    08/28/23 1203 08/28/23 1235   BP: 156/88 128/68   BP Location: Left arm    Patient Position: Sitting    Cuff Size: Large    Pulse: 77    Resp: 20    Temp: 98.3 °F (36.8 °C)    TempSrc: Tympanic    SpO2: 98%    Weight: 84.2 kg (185 lb 9.6 oz)    Height: 5' 11.7" (1.821 m)            Physical Exam  Constitutional:       Appearance: He is well-developed. HENT:      Head: Normocephalic and atraumatic. Right Ear: External ear normal.      Left Ear: External ear normal.   Eyes:      Extraocular Movements: Extraocular movements intact. Conjunctiva/sclera: Conjunctivae normal.   Cardiovascular:      Rate and Rhythm: Normal rate and regular rhythm. Heart sounds: Normal heart sounds. No murmur heard. Pulmonary:      Effort: Pulmonary effort is normal. No respiratory distress. Breath sounds: Normal breath sounds. No wheezing. Musculoskeletal:         General: Normal range of motion. Cervical back: Normal range of motion and neck supple. Skin:     General: Skin is warm and dry. Neurological:      Mental Status: He is alert and oriented to person, place, and time.    Psychiatric:         Mood and Affect: Mood normal.         Behavior: Behavior normal.            Labs:        DO Jax    8/28/2023

## 2023-08-28 NOTE — ASSESSMENT & PLAN NOTE
BMI Counseling: Body mass index is 25.38 kg/m². The BMI is above normal. Nutrition recommendations include reducing portion sizes, decreasing overall calorie intake and 3-5 servings of fruits/vegetables daily. Exercise recommendations include vigorous aerobic physical activity for 75 minutes/week.

## 2023-10-06 ENCOUNTER — APPOINTMENT (OUTPATIENT)
Dept: LAB | Facility: CLINIC | Age: 63
End: 2023-10-06
Payer: COMMERCIAL

## 2023-10-06 DIAGNOSIS — Z12.5 SPECIAL SCREENING FOR MALIGNANT NEOPLASM OF PROSTATE: ICD-10-CM

## 2023-10-06 DIAGNOSIS — I10 HYPERTENSION, ESSENTIAL: ICD-10-CM

## 2023-10-06 DIAGNOSIS — Z80.42 FAMILY HISTORY OF MALIGNANT NEOPLASM OF PROSTATE: ICD-10-CM

## 2023-10-06 DIAGNOSIS — E87.1 HYPOSMOLALITY SYNDROME: ICD-10-CM

## 2023-10-06 DIAGNOSIS — Z11.4 SCREENING FOR HUMAN IMMUNODEFICIENCY VIRUS: ICD-10-CM

## 2023-10-06 DIAGNOSIS — Z11.59 SPECIAL SCREENING EXAMINATION FOR VIRAL DISEASE: ICD-10-CM

## 2023-10-06 LAB
ALBUMIN SERPL BCP-MCNC: 4.2 G/DL (ref 3.5–5)
ALP SERPL-CCNC: 53 U/L (ref 34–104)
ALT SERPL W P-5'-P-CCNC: 16 U/L (ref 7–52)
ANION GAP SERPL CALCULATED.3IONS-SCNC: 7 MMOL/L
AST SERPL W P-5'-P-CCNC: 16 U/L (ref 13–39)
BASOPHILS # BLD AUTO: 0.06 THOUSANDS/ÂΜL (ref 0–0.1)
BASOPHILS NFR BLD AUTO: 1 % (ref 0–1)
BILIRUB SERPL-MCNC: 0.84 MG/DL (ref 0.2–1)
BUN SERPL-MCNC: 20 MG/DL (ref 5–25)
CALCIUM SERPL-MCNC: 9.3 MG/DL (ref 8.4–10.2)
CHLORIDE SERPL-SCNC: 101 MMOL/L (ref 96–108)
CHOLEST SERPL-MCNC: 145 MG/DL
CO2 SERPL-SCNC: 24 MMOL/L (ref 21–32)
CREAT SERPL-MCNC: 0.93 MG/DL (ref 0.6–1.3)
EOSINOPHIL # BLD AUTO: 0.42 THOUSAND/ÂΜL (ref 0–0.61)
EOSINOPHIL NFR BLD AUTO: 6 % (ref 0–6)
ERYTHROCYTE [DISTWIDTH] IN BLOOD BY AUTOMATED COUNT: 12.9 % (ref 11.6–15.1)
GFR SERPL CREATININE-BSD FRML MDRD: 87 ML/MIN/1.73SQ M
GLUCOSE P FAST SERPL-MCNC: 106 MG/DL (ref 65–99)
HCT VFR BLD AUTO: 44.9 % (ref 36.5–49.3)
HDLC SERPL-MCNC: 57 MG/DL
HGB BLD-MCNC: 14.9 G/DL (ref 12–17)
HIV 1+2 AB+HIV1 P24 AG SERPL QL IA: NORMAL
HIV 2 AB SERPL QL IA: NORMAL
HIV1 AB SERPL QL IA: NORMAL
HIV1 P24 AG SERPL QL IA: NORMAL
IMM GRANULOCYTES # BLD AUTO: 0.01 THOUSAND/UL (ref 0–0.2)
IMM GRANULOCYTES NFR BLD AUTO: 0 % (ref 0–2)
LDLC SERPL CALC-MCNC: 80 MG/DL (ref 0–100)
LYMPHOCYTES # BLD AUTO: 1.7 THOUSANDS/ÂΜL (ref 0.6–4.47)
LYMPHOCYTES NFR BLD AUTO: 26 % (ref 14–44)
MCH RBC QN AUTO: 29.1 PG (ref 26.8–34.3)
MCHC RBC AUTO-ENTMCNC: 33.2 G/DL (ref 31.4–37.4)
MCV RBC AUTO: 88 FL (ref 82–98)
MONOCYTES # BLD AUTO: 0.66 THOUSAND/ÂΜL (ref 0.17–1.22)
MONOCYTES NFR BLD AUTO: 10 % (ref 4–12)
NEUTROPHILS # BLD AUTO: 3.77 THOUSANDS/ÂΜL (ref 1.85–7.62)
NEUTS SEG NFR BLD AUTO: 57 % (ref 43–75)
NONHDLC SERPL-MCNC: 88 MG/DL
NRBC BLD AUTO-RTO: 0 /100 WBCS
PLATELET # BLD AUTO: 239 THOUSANDS/UL (ref 149–390)
PMV BLD AUTO: 9.6 FL (ref 8.9–12.7)
POTASSIUM SERPL-SCNC: 4.7 MMOL/L (ref 3.5–5.3)
PROT SERPL-MCNC: 6.6 G/DL (ref 6.4–8.4)
PSA SERPL-MCNC: 1.9 NG/ML (ref 0–4)
RBC # BLD AUTO: 5.12 MILLION/UL (ref 3.88–5.62)
SODIUM SERPL-SCNC: 132 MMOL/L (ref 135–147)
TRIGL SERPL-MCNC: 39 MG/DL
TSH SERPL DL<=0.05 MIU/L-ACNC: 1.93 UIU/ML (ref 0.45–4.5)
WBC # BLD AUTO: 6.62 THOUSAND/UL (ref 4.31–10.16)

## 2023-10-06 PROCEDURE — 86803 HEPATITIS C AB TEST: CPT

## 2023-10-06 PROCEDURE — 36415 COLL VENOUS BLD VENIPUNCTURE: CPT

## 2023-10-06 PROCEDURE — 87389 HIV-1 AG W/HIV-1&-2 AB AG IA: CPT

## 2023-10-06 PROCEDURE — G0103 PSA SCREENING: HCPCS

## 2023-10-06 PROCEDURE — 85025 COMPLETE CBC W/AUTO DIFF WBC: CPT

## 2023-10-06 PROCEDURE — 80061 LIPID PANEL: CPT

## 2023-10-06 PROCEDURE — 80053 COMPREHEN METABOLIC PANEL: CPT

## 2023-10-06 PROCEDURE — 84443 ASSAY THYROID STIM HORMONE: CPT

## 2023-10-07 LAB
HCV AB S/CO SERPL IA: NON REACTIVE
SL AMB INTERPRETATION: NORMAL

## 2023-10-13 ENCOUNTER — OFFICE VISIT (OUTPATIENT)
Dept: FAMILY MEDICINE CLINIC | Facility: CLINIC | Age: 63
End: 2023-10-13
Payer: COMMERCIAL

## 2023-10-13 VITALS
HEART RATE: 75 BPM | TEMPERATURE: 98.6 F | BODY MASS INDEX: 25.22 KG/M2 | HEIGHT: 72 IN | RESPIRATION RATE: 18 BRPM | DIASTOLIC BLOOD PRESSURE: 84 MMHG | SYSTOLIC BLOOD PRESSURE: 120 MMHG | WEIGHT: 186.2 LBS | OXYGEN SATURATION: 97 %

## 2023-10-13 DIAGNOSIS — Z12.11 COLON CANCER SCREENING: ICD-10-CM

## 2023-10-13 DIAGNOSIS — R80.9 PROTEINURIA, UNSPECIFIED TYPE: ICD-10-CM

## 2023-10-13 DIAGNOSIS — Z00.00 ANNUAL PHYSICAL EXAM: ICD-10-CM

## 2023-10-13 DIAGNOSIS — I10 PRIMARY HYPERTENSION: ICD-10-CM

## 2023-10-13 LAB
SL AMB  POCT GLUCOSE, UA: ABNORMAL
SL AMB LEUKOCYTE ESTERASE,UA: ABNORMAL
SL AMB POCT BILIRUBIN,UA: ABNORMAL
SL AMB POCT BLOOD,UA: ABNORMAL
SL AMB POCT CLARITY,UA: CLEAR
SL AMB POCT COLOR,UA: YELLOW
SL AMB POCT KETONES,UA: ABNORMAL
SL AMB POCT NITRITE,UA: ABNORMAL
SL AMB POCT PH,UA: 7.5
SL AMB POCT SPECIFIC GRAVITY,UA: 1.01
SL AMB POCT URINE PROTEIN: ABNORMAL
SL AMB POCT UROBILINOGEN: NORMAL

## 2023-10-13 PROCEDURE — 99396 PREV VISIT EST AGE 40-64: CPT | Performed by: FAMILY MEDICINE

## 2023-10-13 PROCEDURE — 87086 URINE CULTURE/COLONY COUNT: CPT | Performed by: FAMILY MEDICINE

## 2023-10-13 PROCEDURE — 81002 URINALYSIS NONAUTO W/O SCOPE: CPT | Performed by: FAMILY MEDICINE

## 2023-10-13 RX ORDER — LISINOPRIL 20 MG/1
20 TABLET ORAL DAILY
Qty: 90 TABLET | Refills: 0 | Status: SHIPPED | OUTPATIENT
Start: 2023-10-13 | End: 2024-01-11

## 2023-10-13 NOTE — PATIENT INSTRUCTIONS
Wellness Visit for Adults   AMBULATORY CARE:   A wellness visit  is when you see your healthcare provider to get screened for health problems. Your healthcare provider will also give you advice on how to stay healthy. Write down your questions so you remember to ask them. Ask your healthcare provider how often you should have a wellness visit. What happens at a wellness visit:  Your healthcare provider will ask about your health, and your family history of health problems. This includes high blood pressure, heart disease, and cancer. He or she will ask if you have symptoms that concern you, if you smoke, and about your mood. You may also be asked about your intake of medicines, supplements, food, and alcohol. Any of the following may be done: Your weight  will be checked. Your height may also be checked so your body mass index (BMI) can be calculated. Your BMI shows if you are at a healthy weight. Your blood pressure  and heart rate will be checked. Your temperature may also be checked. Blood and urine tests  may be done. Blood tests may be done to check your cholesterol levels. Abnormal cholesterol levels increase your risk for heart disease and stroke. You may also need a blood or urine test to check for diabetes if you are at increased risk. Urine tests may be done to look for signs of an infection or kidney disease. A physical exam  includes checking your heartbeat and lungs with a stethoscope. Your healthcare provider may also check your skin to look for sun damage. Screening tests  may be recommended. A screening test is done to check for diseases that may not cause symptoms. The screening tests you may need depend on your age, gender, family history, and lifestyle habits. For example, colorectal screening may be recommended if you are 48years old or older. Screening tests you need if you are a woman:   A Pap smear  is used to screen for cervical cancer.  Pap smears are usually done every 3 to 5 years depending on your age. You may need them more often if you have had abnormal Pap smear test results in the past. Ask your healthcare provider how often you should have a Pap smear. A mammogram  is an x-ray of your breasts to screen for breast cancer. Experts recommend mammograms every 2 years starting at age 48 years. You may need a mammogram at age 52 years or younger if you have an increased risk for breast cancer. Talk to your healthcare provider about when you should start having mammograms and how often you need them. Vaccines you may need:   Get an influenza vaccine  every year. The influenza vaccine protects you from the flu. Several types of viruses cause the flu. The viruses change over time, so new vaccines are made each year. Get a tetanus-diphtheria (Td) booster vaccine  every 10 years. This vaccine protects you against tetanus and diphtheria. Tetanus is a severe infection that may cause painful muscle spasms and lockjaw. Diphtheria is a severe bacterial infection that causes a thick covering in the back of your mouth and throat. Get a human papillomavirus (HPV) vaccine  if you are female and aged 23 to 32 or male 23 to 24 and never received it. This vaccine protects you from HPV infection. HPV is the most common infection spread by sexual contact. HPV may also cause vaginal, penile, and anal cancers. Get a pneumococcal vaccine  if you are aged 72 years or older. The pneumococcal vaccine is an injection given to protect you from pneumococcal disease. Pneumococcal disease is an infection caused by pneumococcal bacteria. The infection may cause pneumonia, meningitis, or an ear infection. Get a shingles vaccine  if you are 60 or older, even if you have had shingles before. The shingles vaccine is an injection to protect you from the varicella-zoster virus. This is the same virus that causes chickenpox.  Shingles is a painful rash that develops in people who had chickenpox or have been exposed to the virus. How to eat healthy:  My Plate is a model for planning healthy meals. It shows the types and amounts of foods that should go on your plate. Fruits and vegetables make up about half of your plate, and grains and protein make up the other half. A serving of dairy is included on the side of your plate. The amount of calories and serving sizes you need depends on your age, gender, weight, and height. Examples of healthy foods are listed below:  Eat a variety of vegetables  such as dark green, red, and orange vegetables. You can also include canned vegetables low in sodium (salt) and frozen vegetables without added butter or sauces. Eat a variety of fresh fruits , canned fruit in 100% juice, frozen fruit, and dried fruit. Include whole grains. At least half of the grains you eat should be whole grains. Examples include whole-wheat bread, wheat pasta, brown rice, and whole-grain cereals such as oatmeal.    Eat a variety of protein foods such as seafood (fish and shellfish), lean meat, and poultry without skin (turkey and chicken). Examples of lean meats include pork leg, shoulder, or tenderloin, and beef round, sirloin, tenderloin, and extra lean ground beef. Other protein foods include eggs and egg substitutes, beans, peas, soy products, nuts, and seeds. Choose low-fat dairy products such as skim or 1% milk or low-fat yogurt, cheese, and cottage cheese. Limit unhealthy fats  such as butter, hard margarine, and shortening. Exercise:  Exercise at least 30 minutes per day on most days of the week. Some examples of exercise include walking, biking, dancing, and swimming. You can also fit in more physical activity by taking the stairs instead of the elevator or parking farther away from stores. Include muscle strengthening activities 2 days each week. Regular exercise provides many health benefits.  It helps you manage your weight, and decreases your risk for type 2 diabetes, heart disease, stroke, and high blood pressure. Exercise can also help improve your mood. Ask your healthcare provider about the best exercise plan for you. General health and safety guidelines:   Do not smoke. Nicotine and other chemicals in cigarettes and cigars can cause lung damage. Ask your healthcare provider for information if you currently smoke and need help to quit. E-cigarettes or smokeless tobacco still contain nicotine. Talk to your healthcare provider before you use these products. Limit alcohol. A drink of alcohol is 12 ounces of beer, 5 ounces of wine, or 1½ ounces of liquor. Lose weight, if needed. Being overweight increases your risk of certain health conditions. These include heart disease, high blood pressure, type 2 diabetes, and certain types of cancer. Protect your skin. Do not sunbathe or use tanning beds. Use sunscreen with a SPF 15 or higher. Apply sunscreen at least 15 minutes before you go outside. Reapply sunscreen every 2 hours. Wear protective clothing, hats, and sunglasses when you are outside. Drive safely. Always wear your seatbelt. Make sure everyone in your car wears a seatbelt. A seatbelt can save your life if you are in an accident. Do not use your cell phone when you are driving. This could distract you and cause an accident. Pull over if you need to make a call or send a text message. Practice safe sex. Use latex condoms if are sexually active and have more than one partner. Your healthcare provider may recommend screening tests for sexually transmitted infections (STIs). Wear helmets, lifejackets, and protective gear. Always wear a helmet when you ride a bike or motorcycle, go skiing, or play sports that could cause a head injury. Wear protective equipment when you play sports. Wear a lifejacket when you are on a boat or doing water sports.     © Copyright [facility] 2023 Information is for End User's use only and may not be sold, redistributed or otherwise used for commercial purposes. The above information is an  only. It is not intended as medical advice for individual conditions or treatments. Talk to your doctor, nurse or pharmacist before following any medical regimen to see if it is safe and effective for you.

## 2023-10-13 NOTE — PROGRESS NOTES
[id number]    NAME: Ras De León  AGE: 59 y.o. SEX: male  : 1960     DATE: 10/13/2023     Assessment and Plan:     Problem List Items Addressed This Visit          Other    Proteinuria     Will monitor and recheck  If continues we will refer to nephro          Relevant Orders    UA (URINE) with reflex to Scope     Other Visit Diagnoses       Annual physical exam    -  Primary    Primary hypertension        Relevant Medications    lisinopril (ZESTRIL) 20 mg tablet    Colon cancer screening        Relevant Orders    Cologuard            Immunizations and preventive care screenings were discussed with patient today. Appropriate education was printed on patient's after visit summary. Discussed risks and benefits of prostate cancer screening. We discussed the controversial history of PSA screening for prostate cancer in the [facility] as well as the risk of over detection and over treatment of prostate cancer by way of PSA screening. The patient understands that PSA blood testing is an imperfect way to screen for prostate cancer and that elevated PSA levels in the blood may also be caused by infection, inflammation, prostatic trauma or manipulation, urological procedures, or by benign prostatic enlargement. The role of the digital rectal examination in prostate cancer screening was also discussed and I discussed with him that there is large interobserver variability in the findings of digital rectal examination. Counseling:  Alcohol/drug use: discussed moderation in alcohol intake, the recommendations for healthy alcohol use, and avoidance of illicit drug use. Dental Health: discussed importance of regular tooth brushing, flossing, and dental visits. Injury prevention: discussed safety/seat belts, safety helmets, smoke detectors, carbon dioxide detectors, and smoking near bedding or upholstery.   Sexual health: discussed sexually transmitted diseases, partner selection, use of condoms, avoidance of unintended pregnancy, and contraceptive alternatives. Exercise: the importance of regular exercise/physical activity was discussed. Recommend exercise 3-5 times per week for at least 30 minutes. Depression Screening and Follow-up Plan: Patient was screened for depression during today's encounter. They screened negative with a PHQ-2 score of 0. Return in about 1 year (around 10/13/2024) for Annual physical.     Chief Complaint:     Chief Complaint   Patient presents with    Annual Exam      History of Present Illness:     Adult Annual Physical   Patient here for a comprehensive physical exam. The patient reports no problems. Diet and Physical Activity  Diet/Nutrition: well balanced diet. Exercise: moderate cardiovascular exercise. Depression Screening  PHQ-2/9 Depression Screening    Little interest or pleasure in doing things: 0 - not at all  Feeling down, depressed, or hopeless: 0 - not at all  PHQ-2 Score: 0  PHQ-2 Interpretation: Negative depression screen          Review of Systems:     Review of Systems   Constitutional:  Negative for chills and fever. HENT:  Negative for congestion, postnasal drip, rhinorrhea and sinus pain. Eyes:  Negative for photophobia and visual disturbance. Respiratory:  Negative for cough and shortness of breath. Cardiovascular:  Negative for chest pain, palpitations and leg swelling. Gastrointestinal:  Negative for abdominal pain, constipation, diarrhea, nausea and vomiting. Genitourinary:  Negative for difficulty urinating and dysuria. Musculoskeletal:  Negative for arthralgias and myalgias. Skin:  Negative for rash. Neurological:  Negative for dizziness and syncope. Past Medical History:     Past Medical History:   Diagnosis Date    BPH (benign prostatic hyperplasia)     Hypertension       Past Surgical History:     History reviewed.  No pertinent surgical history. Family History:     History reviewed. No pertinent family history. Social History:     Social History     Socioeconomic History    Marital status: Single     Spouse name: None    Number of children: None    Years of education: None    Highest education level: None   Occupational History    None   Tobacco Use    Smoking status: Never    Smokeless tobacco: Never   Vaping Use    Vaping Use: Never used   Substance and Sexual Activity    Alcohol use: Yes     Alcohol/week: 2.0 standard drinks of alcohol     Types: 2 Glasses of wine per week     Comment: a day. . last consumption thursday    Drug use: No    Sexual activity: None   Other Topics Concern    None   Social History Narrative    None     Social Determinants of Health     Financial Resource Strain: Not on file   Food Insecurity: Not on file   Transportation Needs: Not on file   Physical Activity: Not on file   Stress: Not on file   Social Connections: Not on file   Intimate Partner Violence: Not At Risk (10/13/2023)    Humiliation, Afraid, Rape, and Kick questionnaire     Fear of Current or Ex-Partner: No     Emotionally Abused: No     Physically Abused: No     Sexually Abused: No   Housing Stability: Not on file      Current Medications:     Current Outpatient Medications   Medication Sig Dispense Refill    ivermectin (STROMECTOL) 3 MG TABS Take 200 mcg/kg by mouth once      lisinopril (ZESTRIL) 20 mg tablet Take 1 tablet (20 mg total) by mouth daily 90 tablet 0     No current facility-administered medications for this visit. Allergies: Allergies   Allergen Reactions    Ephraim Grass Pollen Allergen Hives      Physical Exam:     /84 (BP Location: Left arm, Patient Position: Sitting, Cuff Size: Standard)   Pulse 75   Temp 98.6 °F (37 °C)   Resp 18   Ht 5' 11.7" (1.821 m)   Wt 84.5 kg (186 lb 3.2 oz)   SpO2 97%   BMI 25.47 kg/m²     Physical Exam  Constitutional:       General: He is not in acute distress.      Appearance: Normal appearance. He is not ill-appearing, toxic-appearing or diaphoretic. HENT:      Head: Normocephalic and atraumatic. Right Ear: Tympanic membrane and ear canal normal.      Left Ear: Tympanic membrane and ear canal normal.      Nose: Nose normal. No congestion. Mouth/Throat:      Mouth: Mucous membranes are moist.      Pharynx: Oropharynx is clear. No oropharyngeal exudate. Eyes:      Extraocular Movements: Extraocular movements intact. Conjunctiva/sclera: Conjunctivae normal.      Pupils: Pupils are equal, round, and reactive to light. Cardiovascular:      Rate and Rhythm: Normal rate and regular rhythm. Pulses: Normal pulses. Heart sounds: No murmur heard. Pulmonary:      Effort: Pulmonary effort is normal.      Breath sounds: Normal breath sounds. No wheezing, rhonchi or rales. Abdominal:      General: Bowel sounds are normal. There is no distension. Palpations: Abdomen is soft. Tenderness: There is no abdominal tenderness. Musculoskeletal:         General: No swelling or tenderness. Normal range of motion. Cervical back: Normal range of motion and neck supple. Skin:     General: Skin is warm and dry. Capillary Refill: Capillary refill takes less than 2 seconds. Neurological:      General: No focal deficit present. Mental Status: He is alert and oriented to person, place, and time. Cranial Nerves: No cranial nerve deficit. Psychiatric:         Mood and Affect: Mood normal.         Behavior: Behavior normal.         Thought Content:  Thought content normal.          Fabricio Hopson DO  [facility]

## 2023-10-13 NOTE — ADDENDUM NOTE
Addended by: Daniel Boyle on: 10/13/2023 03:01 PM     Modules accepted: Orders The number for the [facility] is included in these discharge instructions, you can call to set up an appointment to establish medical care there  Return to the ER if you have new or different symptoms from your previous anxiety  Anxiety   WHAT YOU SHOULD KNOW:   Anxiety is a condition that causes you to feel excessive worry, uneasiness, or fear  Family or work stress, smoking, caffeine, and alcohol can increase your risk for anxiety  Certain medicines or health conditions can also increase your risk  Anxiety may begin gradually, and can become a long-term condition if it is not managed or treated  AFTER YOU LEAVE:   Medicines:   · Medicines  can help you feel more calm and relaxed, and decrease your symptoms  · Take your medicine as directed  Contact your healthcare provider if you think your medicine is not helping or if you have side effects  Tell him if you are allergic to any medicine  Keep a list of the medicines, vitamins, and herbs you take  Include the amounts, and when and why you take them  Bring the list or the pill bottles to follow-up visits  Carry your medicine list with you in case of an emergency  Follow up with your healthcare provider within 2 weeks or as directed:  Write down your questions so you remember to ask them during your visits  Manage anxiety:   · Go to counseling as directed  Cognitive behavioral therapy can help you understand and change how you react to events that trigger your symptoms  · Find ways to manage your symptoms  Activities such as exercise, meditation, or listening to music can help you relax  · Practice deep breathing  Breathing can change how your body reacts to stress  Focus on taking slow, deep breaths several times a day, or during an anxiety attack  Breathe in through your nose, and out through your mouth  · Avoid caffeine  Caffeine can make your symptoms worse   Avoid foods or drinks that are meant to increase your energy level     · Limit or avoid alcohol  Ask your healthcare provider if alcohol is safe for you  You may not be able to drink alcohol if you take certain anxiety or depression medicines  Limit alcohol to 1 drink per day if you are a woman  Limit alcohol to 2 drinks per day if you are a man  A drink of alcohol is 12 ounces of beer, 5 ounces of wine, or 1½ ounces of liquor  Contact your healthcare provider if:   · Your symptoms get worse or do not get better with treatment  · You think your medicine may be causing side effects  · Your anxiety keeps you from doing your regular daily activities  · You have new symptoms since your last visit  · You have questions or concerns about your condition or care  Seek care immediately or call 911 if:   · You have chest pain, tightness, or heaviness that may spread to your shoulders, arms, jaw, neck, or back  · You feel like hurting yourself or someone else  · You feel dizzy, lightheaded, or faint  © 2014 [address] [employer] [employer] is for End User's use only and may not be sold, redistributed or otherwise used for commercial purposes  All illustrations and images included in CareNotes® are the copyrighted property of A D [employer] M [employer] Inc  or [employer]  The above information is an  only  It is not intended as medical advice for individual conditions or treatments  Talk to your doctor, nurse or pharmacist before following any medical regimen to see if it is safe and effective for you  Abdominal Pain   WHAT YOU NEED TO KNOW:   Abdominal pain can be dull, achy, or sharp  You may have pain in one area of your abdomen, or in your entire abdomen  Your pain may be caused by a condition such as constipation, food sensitivity or poisoning, infection, or a blockage  Abdominal pain can also be from a hernia, appendicitis, or an ulcer  Liver, gallbladder, or kidney conditions can also cause abdominal pain   The cause of your abdominal pain may be unknown  DISCHARGE INSTRUCTIONS:   Return to the emergency department if:   · You have new chest pain or shortness of breath  · You have pulsing pain in your upper abdomen or lower back that suddenly becomes constant  · Your pain is in the right lower abdominal area and worsens with movement  · You have a fever over 100 4°F (38°C) or shaking chills  · You are vomiting and cannot keep food or liquids down  · Your pain does not improve or gets worse over the next 8 to 12 hours  · You see blood in your vomit or bowel movements, or they look black and tarry  · Your skin or the whites of your eyes turn yellow  · You are a woman and have a large amount of vaginal bleeding that is not your monthly period  Contact your healthcare provider if:   · You have pain in your lower back  · You are a man and have pain in your testicles  · You have pain when you urinate  · You have questions or concerns about your condition or care  Follow up with your healthcare provider within 24 hours or as directed:  Write down your questions so you remember to ask them during your visits  Medicines:   · Medicines  may be given to calm your stomach and prevent vomiting or to decrease pain  Ask how to take pain medicine safely  · Take your medicine as directed  Contact your healthcare provider if you think your medicine is not helping or if you have side effects  Tell him of her if you are allergic to any medicine  Keep a list of the medicines, vitamins, and herbs you take  Include the amounts, and when and why you take them  Bring the list or the pill bottles to follow-up visits  Carry your medicine list with you in case of an emergency  © 2017 [address] [employer] Information is for End User's use only and may not be sold, redistributed or otherwise used for commercial purposes   All illustrations and images included in CareNotes® are the copyrighted property of [employer]  or Darvin Trinidad  The above information is an  only  It is not intended as medical advice for individual conditions or treatments  Talk to your doctor, nurse or pharmacist before following any medical regimen to see if it is safe and effective for you

## 2023-10-14 LAB — BACTERIA UR CULT: NORMAL

## 2023-11-03 DIAGNOSIS — I10 PRIMARY HYPERTENSION: ICD-10-CM

## 2024-01-19 DIAGNOSIS — I10 PRIMARY HYPERTENSION: ICD-10-CM

## 2024-01-19 RX ORDER — LISINOPRIL 20 MG/1
20 TABLET ORAL DAILY
Qty: 90 TABLET | Refills: 1 | Status: SHIPPED | OUTPATIENT
Start: 2024-01-19

## 2024-03-11 ENCOUNTER — PREP FOR PROCEDURE (OUTPATIENT)
Age: 64
End: 2024-03-11

## 2024-03-11 ENCOUNTER — TELEPHONE (OUTPATIENT)
Age: 64
End: 2024-03-11

## 2024-03-11 DIAGNOSIS — R19.5 POSITIVE COLORECTAL CANCER SCREENING USING COLOGUARD TEST: Primary | ICD-10-CM

## 2024-03-11 DIAGNOSIS — Z12.11 SCREENING FOR COLON CANCER: Primary | ICD-10-CM

## 2024-03-11 LAB — COLOGUARD RESULT REPORTABLE: POSITIVE

## 2024-03-11 NOTE — TELEPHONE ENCOUNTER
03/11/24  Screened by: Nallely Pham    Referring Provide Dr. Russ    Pre- Screening:     There is no height or weight on file to calculate BMI.  25.47  Has patient been referred for a routine screening Colonoscopy? yes  Is the patient between 45-75 years old? yes      Previous Colonoscopy yes   If yes:    Date: 2010    Facility:     Reason:           Does the patient want to see a Gastroenterologist prior to their procedure OR are they having any GI symptoms? no    Has the patient been hospitalized or had abdominal surgery in the past 6 months? no    Does the patient use supplemental oxygen? no    Does the patient take Coumadin, Lovenox, Plavix, Elliquis, Xarelto, or other blood thinning medication? no    Has the patient had a stroke, cardiac event, or stent placed in the past year? no    SCHEDULING STAFF: If patient answers NO to above questions, then schedule procedure. If patient answers YES to above questions, then schedule office appointment.     If patient is between 45yrs - 49yrs, please advise patient that we will have to confirm benefits & coverage with their insurance company for a routine screening colonoscopy.

## 2024-07-25 DIAGNOSIS — I10 PRIMARY HYPERTENSION: ICD-10-CM

## 2024-07-25 RX ORDER — LISINOPRIL 20 MG/1
20 TABLET ORAL DAILY
Qty: 100 TABLET | Refills: 1 | Status: SHIPPED | OUTPATIENT
Start: 2024-07-25

## 2024-08-13 DIAGNOSIS — R06.83 SNORING: ICD-10-CM

## 2024-08-13 DIAGNOSIS — J34.2 DEVIATED SEPTUM: Primary | ICD-10-CM

## 2024-08-22 ENCOUNTER — APPOINTMENT (OUTPATIENT)
Dept: RADIOLOGY | Facility: CLINIC | Age: 64
End: 2024-08-22
Payer: OTHER MISCELLANEOUS

## 2024-08-22 ENCOUNTER — OCCMED (OUTPATIENT)
Dept: URGENT CARE | Facility: CLINIC | Age: 64
End: 2024-08-22
Payer: OTHER MISCELLANEOUS

## 2024-08-22 ENCOUNTER — HOSPITAL ENCOUNTER (OUTPATIENT)
Dept: RADIOLOGY | Facility: HOSPITAL | Age: 64
Discharge: HOME | End: 2024-08-22

## 2024-08-22 DIAGNOSIS — T14.90XA OCCUPATIONAL INJURY: Primary | ICD-10-CM

## 2024-08-22 DIAGNOSIS — T14.90XA OCCUPATIONAL INJURY: ICD-10-CM

## 2024-08-22 PROCEDURE — 73564 X-RAY EXAM KNEE 4 OR MORE: CPT

## 2024-08-22 PROCEDURE — G0382 LEV 3 HOSP TYPE B ED VISIT: HCPCS | Performed by: PHYSICIAN ASSISTANT

## 2024-08-22 PROCEDURE — 99283 EMERGENCY DEPT VISIT LOW MDM: CPT | Performed by: PHYSICIAN ASSISTANT

## 2024-08-29 ENCOUNTER — OCCMED (OUTPATIENT)
Dept: URGENT CARE | Facility: CLINIC | Age: 64
End: 2024-08-29
Payer: OTHER MISCELLANEOUS

## 2024-08-29 DIAGNOSIS — T14.90XA OCCUPATIONAL INJURY: Primary | ICD-10-CM

## 2024-08-29 PROCEDURE — 99213 OFFICE O/P EST LOW 20 MIN: CPT | Performed by: PHYSICIAN ASSISTANT

## 2024-09-17 DIAGNOSIS — N52.9 ERECTILE DYSFUNCTION, UNSPECIFIED ERECTILE DYSFUNCTION TYPE: Primary | ICD-10-CM

## 2024-09-17 DIAGNOSIS — Z12.5 SCREENING FOR PROSTATE CANCER: ICD-10-CM

## 2024-09-17 DIAGNOSIS — Z00.00 ANNUAL PHYSICAL EXAM: ICD-10-CM

## 2024-09-17 DIAGNOSIS — I10 ESSENTIAL HYPERTENSION: ICD-10-CM

## 2024-10-02 ENCOUNTER — APPOINTMENT (OUTPATIENT)
Dept: LAB | Facility: CLINIC | Age: 64
End: 2024-10-02
Payer: COMMERCIAL

## 2024-10-02 DIAGNOSIS — Z00.00 ANNUAL PHYSICAL EXAM: ICD-10-CM

## 2024-10-02 DIAGNOSIS — I10 ESSENTIAL HYPERTENSION: ICD-10-CM

## 2024-10-02 DIAGNOSIS — N52.9 ERECTILE DYSFUNCTION, UNSPECIFIED ERECTILE DYSFUNCTION TYPE: ICD-10-CM

## 2024-10-02 DIAGNOSIS — Z12.5 SCREENING FOR PROSTATE CANCER: ICD-10-CM

## 2024-10-02 LAB
ALBUMIN SERPL BCG-MCNC: 4.6 G/DL (ref 3.5–5)
ALP SERPL-CCNC: 67 U/L (ref 34–104)
ALT SERPL W P-5'-P-CCNC: 31 U/L (ref 7–52)
ANION GAP SERPL CALCULATED.3IONS-SCNC: 6 MMOL/L (ref 4–13)
AST SERPL W P-5'-P-CCNC: 19 U/L (ref 13–39)
BACTERIA UR QL AUTO: NORMAL /HPF
BASOPHILS # BLD AUTO: 0.07 THOUSANDS/ΜL (ref 0–0.1)
BASOPHILS NFR BLD AUTO: 1 % (ref 0–1)
BILIRUB SERPL-MCNC: 0.94 MG/DL (ref 0.2–1)
BILIRUB UR QL STRIP: NEGATIVE
BUN SERPL-MCNC: 18 MG/DL (ref 5–25)
CALCIUM SERPL-MCNC: 9.4 MG/DL (ref 8.4–10.2)
CHLORIDE SERPL-SCNC: 100 MMOL/L (ref 96–108)
CHOLEST SERPL-MCNC: 177 MG/DL
CLARITY UR: CLEAR
CO2 SERPL-SCNC: 27 MMOL/L (ref 21–32)
COLOR UR: ABNORMAL
CREAT SERPL-MCNC: 0.96 MG/DL (ref 0.6–1.3)
EOSINOPHIL # BLD AUTO: 0.49 THOUSAND/ΜL (ref 0–0.61)
EOSINOPHIL NFR BLD AUTO: 6 % (ref 0–6)
ERYTHROCYTE [DISTWIDTH] IN BLOOD BY AUTOMATED COUNT: 13.1 % (ref 11.6–15.1)
GFR SERPL CREATININE-BSD FRML MDRD: 83 ML/MIN/1.73SQ M
GLUCOSE P FAST SERPL-MCNC: 103 MG/DL (ref 65–99)
GLUCOSE UR STRIP-MCNC: NEGATIVE MG/DL
HCT VFR BLD AUTO: 48.7 % (ref 36.5–49.3)
HDLC SERPL-MCNC: 67 MG/DL
HGB BLD-MCNC: 16 G/DL (ref 12–17)
HGB UR QL STRIP.AUTO: NEGATIVE
IMM GRANULOCYTES # BLD AUTO: 0.04 THOUSAND/UL (ref 0–0.2)
IMM GRANULOCYTES NFR BLD AUTO: 1 % (ref 0–2)
KETONES UR STRIP-MCNC: NEGATIVE MG/DL
LDLC SERPL CALC-MCNC: 99 MG/DL (ref 0–100)
LEUKOCYTE ESTERASE UR QL STRIP: NEGATIVE
LYMPHOCYTES # BLD AUTO: 1.8 THOUSANDS/ΜL (ref 0.6–4.47)
LYMPHOCYTES NFR BLD AUTO: 21 % (ref 14–44)
MCH RBC QN AUTO: 28.9 PG (ref 26.8–34.3)
MCHC RBC AUTO-ENTMCNC: 32.9 G/DL (ref 31.4–37.4)
MCV RBC AUTO: 88 FL (ref 82–98)
MONOCYTES # BLD AUTO: 0.83 THOUSAND/ΜL (ref 0.17–1.22)
MONOCYTES NFR BLD AUTO: 10 % (ref 4–12)
NEUTROPHILS # BLD AUTO: 5.24 THOUSANDS/ΜL (ref 1.85–7.62)
NEUTS SEG NFR BLD AUTO: 61 % (ref 43–75)
NITRITE UR QL STRIP: NEGATIVE
NON-SQ EPI CELLS URNS QL MICRO: NORMAL /HPF
NONHDLC SERPL-MCNC: 110 MG/DL
NRBC BLD AUTO-RTO: 0 /100 WBCS
PH UR STRIP.AUTO: 7.5 [PH]
PLATELET # BLD AUTO: 292 THOUSANDS/UL (ref 149–390)
PMV BLD AUTO: 9.4 FL (ref 8.9–12.7)
POTASSIUM SERPL-SCNC: 4.7 MMOL/L (ref 3.5–5.3)
PROT SERPL-MCNC: 7.1 G/DL (ref 6.4–8.4)
PROT UR STRIP-MCNC: ABNORMAL MG/DL
PSA SERPL-MCNC: 2.08 NG/ML (ref 0–4)
RBC # BLD AUTO: 5.54 MILLION/UL (ref 3.88–5.62)
RBC #/AREA URNS AUTO: NORMAL /HPF
SODIUM SERPL-SCNC: 133 MMOL/L (ref 135–147)
SP GR UR STRIP.AUTO: 1.02 (ref 1–1.03)
TESTOST SERPL-MSCNC: 456 NG/DL
TRIGL SERPL-MCNC: 56 MG/DL
TSH SERPL DL<=0.05 MIU/L-ACNC: 2.78 UIU/ML (ref 0.45–4.5)
UROBILINOGEN UR STRIP-ACNC: <2 MG/DL
WBC # BLD AUTO: 8.47 THOUSAND/UL (ref 4.31–10.16)
WBC #/AREA URNS AUTO: NORMAL /HPF

## 2024-10-02 PROCEDURE — 80061 LIPID PANEL: CPT

## 2024-10-02 PROCEDURE — G0103 PSA SCREENING: HCPCS

## 2024-10-02 PROCEDURE — 36415 COLL VENOUS BLD VENIPUNCTURE: CPT

## 2024-10-02 PROCEDURE — 81001 URINALYSIS AUTO W/SCOPE: CPT

## 2024-10-02 PROCEDURE — 84403 ASSAY OF TOTAL TESTOSTERONE: CPT

## 2024-10-02 PROCEDURE — 84443 ASSAY THYROID STIM HORMONE: CPT

## 2024-10-02 PROCEDURE — 85025 COMPLETE CBC W/AUTO DIFF WBC: CPT

## 2024-10-02 PROCEDURE — 80053 COMPREHEN METABOLIC PANEL: CPT

## 2024-10-15 ENCOUNTER — OFFICE VISIT (OUTPATIENT)
Dept: FAMILY MEDICINE CLINIC | Facility: CLINIC | Age: 64
End: 2024-10-15
Payer: COMMERCIAL

## 2024-10-15 VITALS
SYSTOLIC BLOOD PRESSURE: 130 MMHG | HEIGHT: 72 IN | DIASTOLIC BLOOD PRESSURE: 80 MMHG | TEMPERATURE: 97.3 F | WEIGHT: 186 LBS | RESPIRATION RATE: 17 BRPM | BODY MASS INDEX: 25.19 KG/M2 | HEART RATE: 64 BPM | OXYGEN SATURATION: 100 %

## 2024-10-15 DIAGNOSIS — I10 ESSENTIAL HYPERTENSION: ICD-10-CM

## 2024-10-15 DIAGNOSIS — E87.1 HYPONATREMIA: ICD-10-CM

## 2024-10-15 DIAGNOSIS — N18.2 STAGE 2 CHRONIC KIDNEY DISEASE: ICD-10-CM

## 2024-10-15 DIAGNOSIS — Z00.00 ANNUAL PHYSICAL EXAM: Primary | ICD-10-CM

## 2024-10-15 PROCEDURE — 99396 PREV VISIT EST AGE 40-64: CPT | Performed by: FAMILY MEDICINE

## 2024-10-15 NOTE — PROGRESS NOTES
Adult Annual Physical  Name: Lonny Goldberg      : 1960      MRN: [id number]  Encounter Provider: Sara Russ DO  Encounter Date: 10/15/2024   Encounter department: [facility]    Assessment & Plan  Annual physical exam         Essential hypertension    Orders:    Ambulatory Referral to Nephrology; Future    Hyponatremia    Orders:    Ambulatory Referral to Nephrology; Future    Stage 2 chronic kidney disease  Lab Results   Component Value Date    EGFR 83 10/02/2024    EGFR 87 10/06/2023    EGFR 85 2023    CREATININE 0.96 10/02/2024    CREATININE 0.93 10/06/2023    CREATININE 0.95 2023       Orders:    Ambulatory Referral to Nephrology; Future    Immunizations and preventive care screenings were discussed with patient today. Appropriate education was printed on patient's after visit summary.    Discussed risks and benefits of prostate cancer screening. We discussed the controversial history of PSA screening for prostate cancer in the [place] as well as the risk of over detection and over treatment of prostate cancer by way of PSA screening.  The patient understands that PSA blood testing is an imperfect way to screen for prostate cancer and that elevated PSA levels in the blood may also be caused by infection, inflammation, prostatic trauma or manipulation, urological procedures, or by benign prostatic enlargement.    The role of the digital rectal examination in prostate cancer screening was also discussed and I discussed with him that there is large interobserver variability in the findings of digital rectal examination.    Counseling:  Alcohol/drug use: discussed moderation in alcohol intake, the recommendations for healthy alcohol use, and avoidance of illicit drug use.  Dental Health: discussed importance of regular tooth brushing, flossing, and dental visits.  Injury prevention: discussed safety/seat belts, safety helmets, smoke detectors, carbon  monoxide detectors, and smoking near bedding or upholstery.  Sexual health: discussed sexually transmitted diseases, partner selection, use of condoms, avoidance of unintended pregnancy, and contraceptive alternatives.  Exercise: the importance of regular exercise/physical activity was discussed. Recommend exercise 3-5 times per week for at least 30 minutes.          History of Present Illness     Adult Annual Physical:  Patient presents for annual physical.     Diet and Physical Activity:  - Diet/Nutrition: well balanced diet.  - Exercise: moderate cardiovascular exercise.    Depression Screening:  - PHQ-2 Score: 0    Review of Systems   Constitutional:  Negative for chills and fever.   HENT:  Negative for congestion, postnasal drip, rhinorrhea and sinus pain.    Eyes:  Negative for photophobia and visual disturbance.   Respiratory:  Negative for cough and shortness of breath.    Cardiovascular:  Negative for chest pain, palpitations and leg swelling.   Gastrointestinal:  Negative for abdominal pain, constipation, diarrhea, nausea and vomiting.   Genitourinary:  Negative for difficulty urinating and dysuria.   Musculoskeletal:  Negative for arthralgias and myalgias.   Skin:  Negative for rash.   Neurological:  Negative for dizziness and syncope.     Pertinent Medical History     Medical History Reviewed by provider this encounter:  Tobacco  Allergies  Meds  Problems  Med Hx  Surg Hx  Fam Hx       Past Medical History   Past Medical History:   Diagnosis Date    BPH (benign prostatic hyperplasia)     Hypertension      History reviewed. No pertinent surgical history.  History reviewed. No pertinent family history.  Current Outpatient Medications on File Prior to Visit   Medication Sig Dispense Refill    lisinopril (ZESTRIL) 20 mg tablet TAKE 1 TABLET BY MOUTH EVERY DAY (Patient taking differently: Take 40 mg by mouth daily) 100 tablet 1    ivermectin (STROMECTOL) 3 MG TABS Take 200 mcg/kg by mouth once (Patient  "not taking: Reported on 10/15/2024)       No current facility-administered medications on file prior to visit.     Allergies   Allergen Reactions    Ephraim Grass Pollen Allergen Hives      Current Outpatient Medications on File Prior to Visit   Medication Sig Dispense Refill    lisinopril (ZESTRIL) 20 mg tablet TAKE 1 TABLET BY MOUTH EVERY DAY (Patient taking differently: Take 40 mg by mouth daily) 100 tablet 1    ivermectin (STROMECTOL) 3 MG TABS Take 200 mcg/kg by mouth once (Patient not taking: Reported on 10/15/2024)       No current facility-administered medications on file prior to visit.      Social History     Tobacco Use    Smoking status: Never    Smokeless tobacco: Never   Vaping Use    Vaping status: Never Used   Substance and Sexual Activity    Alcohol use: Yes     Alcohol/week: 2.0 standard drinks of alcohol     Types: 2 Glasses of wine per week     Comment: a day.. last consumption thursday    Drug use: No    Sexual activity: Not on file       Objective     /80   Pulse 64   Temp (!) 97.3 °F (36.3 °C) (Tympanic)   Resp 17   Ht 5' 11.7\" (1.821 m)   Wt 84.4 kg (186 lb)   SpO2 100%   BMI 25.44 kg/m²     Physical Exam  Constitutional:       General: He is not in acute distress.     Appearance: Normal appearance. He is not ill-appearing, toxic-appearing or diaphoretic.   HENT:      Head: Normocephalic and atraumatic.      Right Ear: Tympanic membrane and ear canal normal.      Left Ear: Tympanic membrane and ear canal normal.      Nose: Nose normal. No congestion.      Mouth/Throat:      Mouth: Mucous membranes are moist.      Pharynx: Oropharynx is clear. No oropharyngeal exudate.   Eyes:      Extraocular Movements: Extraocular movements intact.      Conjunctiva/sclera: Conjunctivae normal.      Pupils: Pupils are equal, round, and reactive to light.   Cardiovascular:      Rate and Rhythm: Normal rate and regular rhythm.      Pulses: Normal pulses.      Heart sounds: No murmur " heard.  Pulmonary:      Effort: Pulmonary effort is normal.      Breath sounds: Normal breath sounds. No wheezing, rhonchi or rales.   Abdominal:      General: Bowel sounds are normal. There is no distension.      Palpations: Abdomen is soft.      Tenderness: There is no abdominal tenderness.   Musculoskeletal:         General: No swelling or tenderness. Normal range of motion.      Cervical back: Normal range of motion and neck supple.   Skin:     General: Skin is warm and dry.      Capillary Refill: Capillary refill takes less than 2 seconds.   Neurological:      General: No focal deficit present.      Mental Status: He is alert and oriented to person, place, and time.      Cranial Nerves: No cranial nerve deficit.   Psychiatric:         Mood and Affect: Mood normal.         Behavior: Behavior normal.         Thought Content: Thought content normal.

## 2024-10-15 NOTE — PATIENT INSTRUCTIONS
"Patient Education     Routine physical for adults   The Basics   Written by the doctors and editors at [facility]   What is a physical? -- A physical is a routine visit, or \"check-up,\" with your doctor. You might also hear it called a \"wellness visit\" or \"preventive visit.\"  During each visit, the doctor will:   Ask about your physical and mental health   Ask about your habits, behaviors, and lifestyle   Do an exam   Give you vaccines if needed   Talk to you about any medicines you take   Give advice about your health   Answer your questions  Getting regular check-ups is an important part of taking care of your health. It can help your doctor find and treat any problems you have. But it's also important for preventing health problems.  A routine physical is different from a \"sick visit.\" A sick visit is when you see a doctor because of a health concern or problem. Since physicals are scheduled ahead of time, you can think about what you want to ask the doctor.  How often should I get a physical? -- It depends on your age and health. In general, for people age 21 years and older:   If you are younger than 50 years, you might be able to get a physical every 3 years.   If you are 50 years or older, your doctor might recommend a physical every year.  If you have an ongoing health condition, like diabetes or high blood pressure, your doctor will probably want to see you more often.  What happens during a physical? -- In general, each visit will include:   Physical exam - The doctor or nurse will check your height, weight, heart rate, and blood pressure. They will also look at your eyes and ears. They will ask about how you are feeling and whether you have any symptoms that bother you.   Medicines - It's a good idea to bring a list of all the medicines you take to each doctor visit. Your doctor will talk to you about your medicines and answer any questions. Tell them if you are having any side effects that bother you. You " "should also tell them if you are having trouble paying for any of your medicines.   Habits and behaviors - This includes:   Your diet   Your exercise habits   Whether you smoke, drink alcohol, or use drugs   Whether you are sexually active   Whether you feel safe at home  Your doctor will talk to you about things you can do to improve your health and lower your risk of health problems. They will also offer help and support. For example, if you want to quit smoking, they can give you advice and might prescribe medicines. If you want to improve your diet or get more physical activity, they can help you with this, too.   Lab tests, if needed - The tests you get will depend on your age and situation. For example, your doctor might want to check your:   Cholesterol   Blood sugar   Iron level   Vaccines - The recommended vaccines will depend on your age, health, and what vaccines you already had. Vaccines are very important because they can prevent certain serious or deadly infections.   Discussion of screening - \"Screening\" means checking for diseases or other health problems before they cause symptoms. Your doctor can recommend screening based on your age, risk, and preferences. This might include tests to check for:   Cancer, such as breast, prostate, cervical, ovarian, colorectal, prostate, lung, or skin cancer   Sexually transmitted infections, such as chlamydia and gonorrhea   Mental health conditions like depression and anxiety  Your doctor will talk to you about the different types of screening tests. They can help you decide which screenings to have. They can also explain what the results might mean.   Answering questions - The physical is a good time to ask the doctor or nurse questions about your health. If needed, they can refer you to other doctors or specialists, too.  Adults older than 65 years often need other care, too. As you get older, your doctor will talk to you about:   How to prevent falling at " home   Hearing or vision tests   Memory testing   How to take your medicines safely   Making sure that you have the help and support you need at home  All topics are updated as new evidence becomes available and our peer review process is complete.  This topic retrieved from [employer] on: May 02, 2024.  Topic 353584 Version 1.0  Release: 32.4.3 - C32.122  © 2024 UpToDate, Inc. and/or its affiliates. All rights reserved.  Consumer Information Use and Disclaimer   Disclaimer: This generalized information is a limited summary of diagnosis, treatment, and/or medication information. It is not meant to be comprehensive and should be used as a tool to help the user understand and/or assess potential diagnostic and treatment options. It does NOT include all information about conditions, treatments, medications, side effects, or risks that may apply to a specific patient. It is not intended to be medical advice or a substitute for the medical advice, diagnosis, or treatment of a health care provider based on the health care provider's examination and assessment of a patient's specific and unique circumstances. Patients must speak with a health care provider for complete information about their health, medical questions, and treatment options, including any risks or benefits regarding use of medications. This information does not endorse any treatments or medications as safe, effective, or approved for treating a specific patient. UpToDate, Inc. and its affiliates disclaim any warranty or liability relating to this information or the use thereof.The use of this information is governed by the Terms of Use, available at https://www.woltersRepka.comuwer.com/en/know/clinical-effectiveness-terms. 2024© UpToDate, Inc. and its affiliates and/or licensors. All rights reserved.  Copyright   © 2024 UpToDate, Inc. and/or its affiliates. All rights reserved.

## 2024-10-15 NOTE — ASSESSMENT & PLAN NOTE
Lab Results   Component Value Date    EGFR 83 10/02/2024    EGFR 87 10/06/2023    EGFR 85 08/07/2023    CREATININE 0.96 10/02/2024    CREATININE 0.93 10/06/2023    CREATININE 0.95 08/07/2023       Orders:    Ambulatory Referral to Nephrology; Future

## 2024-11-12 ENCOUNTER — TELEPHONE (OUTPATIENT)
Dept: FAMILY MEDICINE CLINIC | Facility: CLINIC | Age: 64
End: 2024-11-12

## 2024-11-12 DIAGNOSIS — I10 PRIMARY HYPERTENSION: ICD-10-CM

## 2024-11-12 NOTE — TELEPHONE ENCOUNTER
Patient called the RX Refill Line. Message is being forwarded to the office.     Patient is requesting a new script for his lisinopril    lisinopril (ZESTRIL) 20 mg tablet.  Patient stated that he takes this 2x daily now.  200 tablets + 1 refill  [facility]/pharmacy #[id number] - [place]     Please contact patient at   [phone]

## 2024-11-14 RX ORDER — LISINOPRIL 20 MG/1
40 TABLET ORAL DAILY
Qty: 200 TABLET | Refills: 1 | Status: SHIPPED | OUTPATIENT
Start: 2024-11-14

## 2024-11-18 ENCOUNTER — TELEPHONE (OUTPATIENT)
Age: 64
End: 2024-11-18

## 2024-11-18 NOTE — TELEPHONE ENCOUNTER
Pt called stating he has begun an exercise routine that is new to his lifestyle, and has given up alcohol. He stated he now gets headaches in the back of his neck sometimes all day, which radiates to his temples. Pt made appt for 11/20 to discuss this and BP medication. He has been noticing he has HBP numbers in the AM. Please call pt if needing to advise ahead of his appt on 11/20.

## 2024-11-20 ENCOUNTER — OFFICE VISIT (OUTPATIENT)
Dept: FAMILY MEDICINE CLINIC | Facility: CLINIC | Age: 64
End: 2024-11-20
Payer: COMMERCIAL

## 2024-11-20 VITALS
OXYGEN SATURATION: 98 % | HEART RATE: 71 BPM | DIASTOLIC BLOOD PRESSURE: 92 MMHG | SYSTOLIC BLOOD PRESSURE: 144 MMHG | BODY MASS INDEX: 26.28 KG/M2 | TEMPERATURE: 98.2 F | HEIGHT: 72 IN | WEIGHT: 194 LBS | RESPIRATION RATE: 17 BRPM

## 2024-11-20 DIAGNOSIS — I10 ESSENTIAL HYPERTENSION: Primary | ICD-10-CM

## 2024-11-20 PROCEDURE — 99214 OFFICE O/P EST MOD 30 MIN: CPT

## 2024-11-20 RX ORDER — AMLODIPINE BESYLATE 5 MG/1
5 TABLET ORAL DAILY
Qty: 30 TABLET | Refills: 5 | Status: SHIPPED | OUTPATIENT
Start: 2024-11-20

## 2024-11-20 NOTE — ASSESSMENT & PLAN NOTE
Currently unstable. Elevated home and in office BP. Will add second agent. Amlodipine 5 mg ordered today. Continue lisinopril 40 mg as prescribed. Pt to continue home BP monitoring and bring results to follow up. Pt to bring BP cuff to appointment for accuracy check. Continue making heart healthy dietary changes. Follow up in 3 weeks for recheck.  Orders:    amLODIPine (NORVASC) 5 mg tablet; Take 1 tablet (5 mg total) by mouth daily

## 2024-11-20 NOTE — PATIENT INSTRUCTIONS
Continue to follow a heart healthy and low salt diet. Take your blood pressure once per day for the next 2 weeks. Take your blood pressure first thing in the morning after waking and after sitting quietly with your feet flat on the ground for 5 minutes. Record your blood pressure readings and bring them with you to your follow up visit in 3 weeks. Bring your blood pressure cuff to your follow up visit.

## 2024-11-20 NOTE — PROGRESS NOTES
Name: Lonny Goldberg      : 1960      MRN: [id number]  Encounter Provider: ISH Martinez  Encounter Date: 2024   Encounter department: [facility]  :  Assessment & Plan  Essential hypertension  Currently unstable. Elevated home and in office BP. Will add second agent. Amlodipine 5 mg ordered today. Continue lisinopril 40 mg as prescribed. Pt to continue home BP monitoring and bring results to follow up. Pt to bring BP cuff to appointment for accuracy check. Continue making heart healthy dietary changes. Follow up in 3 weeks for recheck.  Orders:    amLODIPine (NORVASC) 5 mg tablet; Take 1 tablet (5 mg total) by mouth daily           History of Present Illness     Lonny Goldberg is a 64 y.o. year old male who presents today with a concern of high blood pressure. He thinks his bp cuff at home might be wrong. BP at home 154/99 and 165/104 in the morning. Has been getting some headaches over the last 2 weeks and takes ibuprofen which is helpful. Did not take BP when had headache.       Hypertension  Associated symptoms include headaches. Pertinent negatives include no chest pain, palpitations or shortness of breath.   Headache    Review of Systems   Constitutional: Negative.  Negative for chills, fatigue and fever.   HENT: Negative.  Negative for congestion, ear pain, rhinorrhea and sore throat.    Eyes: Negative.  Negative for pain and visual disturbance.   Respiratory: Negative.  Negative for cough and shortness of breath.    Cardiovascular: Negative.  Negative for chest pain, palpitations and leg swelling.   Gastrointestinal:  Negative for abdominal pain, constipation, diarrhea, nausea and vomiting.   Endocrine: Negative.    Genitourinary: Negative.  Negative for dysuria, frequency and urgency.   Musculoskeletal: Negative.  Negative for back pain and myalgias.   Skin: Negative.  Negative for rash.   Allergic/Immunologic: Negative.    Neurological:  Positive for  "headaches. Negative for dizziness, weakness and light-headedness.   Hematological: Negative.    Psychiatric/Behavioral: Negative.            Objective   /84 (BP Location: Left arm, Patient Position: Sitting, Cuff Size: Large)   Pulse 71   Temp 98.2 °F (36.8 °C) (Tympanic)   Resp 17   Ht 5' 11.7\" (1.821 m)   Wt 88 kg (194 lb)   SpO2 98%   BMI 26.53 kg/m²      Physical Exam  Vitals and nursing note reviewed.   Constitutional:       General: He is not in acute distress.     Appearance: Normal appearance. He is not ill-appearing.   HENT:      Head: Normocephalic and atraumatic.   Cardiovascular:      Rate and Rhythm: Normal rate and regular rhythm.      Heart sounds: Normal heart sounds. No murmur heard.  Pulmonary:      Effort: Pulmonary effort is normal. No respiratory distress.      Breath sounds: Normal breath sounds. No wheezing.   Musculoskeletal:         General: Normal range of motion.      Cervical back: Normal range of motion.   Skin:     General: Skin is warm and dry.      Capillary Refill: Capillary refill takes less than 2 seconds.   Neurological:      General: No focal deficit present.      Mental Status: He is alert and oriented to person, place, and time.   Psychiatric:         Mood and Affect: Mood normal.         Behavior: Behavior normal.         "

## 2024-11-25 ENCOUNTER — TELEPHONE (OUTPATIENT)
Dept: FAMILY MEDICINE CLINIC | Facility: CLINIC | Age: 64
End: 2024-11-25

## 2024-12-12 DIAGNOSIS — I10 ESSENTIAL HYPERTENSION: ICD-10-CM

## 2024-12-13 ENCOUNTER — OFFICE VISIT (OUTPATIENT)
Dept: FAMILY MEDICINE CLINIC | Facility: CLINIC | Age: 64
End: 2024-12-13
Payer: COMMERCIAL

## 2024-12-13 VITALS
HEART RATE: 97 BPM | DIASTOLIC BLOOD PRESSURE: 78 MMHG | OXYGEN SATURATION: 97 % | TEMPERATURE: 97.4 F | HEIGHT: 72 IN | WEIGHT: 199.6 LBS | RESPIRATION RATE: 17 BRPM | SYSTOLIC BLOOD PRESSURE: 130 MMHG | BODY MASS INDEX: 27.04 KG/M2

## 2024-12-13 DIAGNOSIS — I10 ESSENTIAL HYPERTENSION: Primary | ICD-10-CM

## 2024-12-13 DIAGNOSIS — N52.8 OTHER MALE ERECTILE DYSFUNCTION: ICD-10-CM

## 2024-12-13 PROCEDURE — 99214 OFFICE O/P EST MOD 30 MIN: CPT | Performed by: FAMILY MEDICINE

## 2024-12-13 RX ORDER — AMLODIPINE BESYLATE 5 MG/1
5 TABLET ORAL DAILY
Qty: 90 TABLET | Refills: 5 | Status: SHIPPED | OUTPATIENT
Start: 2024-12-13

## 2024-12-13 RX ORDER — AMLODIPINE BESYLATE 5 MG/1
5 TABLET ORAL DAILY
Qty: 90 TABLET | Refills: 1 | Status: SHIPPED | OUTPATIENT
Start: 2024-12-13

## 2024-12-13 RX ORDER — SILDENAFIL 25 MG/1
25 TABLET, FILM COATED ORAL DAILY PRN
Qty: 10 TABLET | Refills: 0 | Status: SHIPPED | OUTPATIENT
Start: 2024-12-13

## 2024-12-13 NOTE — ASSESSMENT & PLAN NOTE
He is doing well on current medications  Continue lisinopril 40 mg and amlodipine 5 mg daily as prescribed  He is tolerating well without side effects

## 2024-12-13 NOTE — PROGRESS NOTES
Lonny Goldberg 1960 male MRN: [id number]    Family Medicine Follow-up Visit    ASSESSMENT/PLAN  Problem List Items Addressed This Visit          Cardiovascular and Mediastinum    Essential hypertension - Primary    He is doing well on current medications  Continue lisinopril 40 mg and amlodipine 5 mg daily as prescribed  He is tolerating well without side effects          Relevant Medications    amLODIPine (NORVASC) 5 mg tablet       Other    Other male erectile dysfunction    Patient wishes trial of vigra for ED  Counseled on correct use of medication           Relevant Medications    sildenafil (VIAGRA) 25 MG tablet                Future Appointments   Date Time Provider Department Center   2/3/2025  8:30 AM DO QUOC Michele [facility]   10/17/2025  3:20 PM DO HATTIE [facility]          SUBJECTIVE  CC: Blood Pressure Check (Started amlodipine 5 mg. /)      HPI:  Lonny Goldberg is a 64 y.o. male who presents for HTN check up  Reports home readings are better now since adding on the amlodipine  He is using his cuff and getting good readings.     HPI    Review of Systems   Constitutional:  Negative for chills and fever.   HENT:  Negative for congestion, postnasal drip, rhinorrhea and sinus pain.    Eyes:  Negative for photophobia and visual disturbance.   Respiratory:  Negative for cough and shortness of breath.    Cardiovascular:  Negative for chest pain, palpitations and leg swelling.   Gastrointestinal:  Negative for abdominal pain, constipation, diarrhea, nausea and vomiting.   Genitourinary:  Negative for difficulty urinating and dysuria.   Musculoskeletal:  Negative for arthralgias and myalgias.   Skin:  Negative for rash.   Neurological:  Negative for dizziness and syncope.       Historical Information   The patient history was reviewed as follows:    Past Medical History:   Diagnosis Date    BPH (benign prostatic hyperplasia)     Hypertension      No past surgical history  "on file.  No family history on file.   Social History   Social History     Substance and Sexual Activity   Alcohol Use Yes    Alcohol/week: 2.0 standard drinks of alcohol    Types: 2 Glasses of wine per week    Comment: a day.. last consumption thursday     Social History     Substance and Sexual Activity   Drug Use No     Social History     Tobacco Use   Smoking Status Never   Smokeless Tobacco Never       Medications:     Current Outpatient Medications:     amLODIPine (NORVASC) 5 mg tablet, Take 1 tablet (5 mg total) by mouth daily, Disp: 90 tablet, Rfl: 5    lisinopril (ZESTRIL) 20 mg tablet, Take 2 tablets (40 mg total) by mouth daily, Disp: 200 tablet, Rfl: 1    sildenafil (VIAGRA) 25 MG tablet, Take 1 tablet (25 mg total) by mouth daily as needed for erectile dysfunction, Disp: 10 tablet, Rfl: 0    amLODIPine (NORVASC) 5 mg tablet, TAKE 1 TABLET (5 MG TOTAL) BY MOUTH DAILY., Disp: 90 tablet, Rfl: 1    ivermectin (STROMECTOL) 3 MG TABS, Take 200 mcg/kg by mouth once (Patient not taking: Reported on 10/15/2024), Disp: , Rfl:   Allergies   Allergen Reactions    Ephraim Grass Pollen Allergen Hives       OBJECTIVE    Vitals:   Vitals:    12/13/24 0846 12/13/24 0903   BP: 142/78 130/78   BP Location: Left arm    Patient Position: Sitting    Cuff Size: Large    Pulse: 97    Resp: 17    Temp: (!) 97.4 °F (36.3 °C)    TempSrc: Tympanic    SpO2: 97%    Weight: 90.5 kg (199 lb 9.6 oz)    Height: 5' 11.7\" (1.821 m)            Physical Exam  Constitutional:       Appearance: He is well-developed.   HENT:      Head: Normocephalic and atraumatic.      Right Ear: External ear normal.      Left Ear: External ear normal.   Eyes:      Extraocular Movements: Extraocular movements intact.      Conjunctiva/sclera: Conjunctivae normal.   Cardiovascular:      Rate and Rhythm: Normal rate and regular rhythm.      Heart sounds: Normal heart sounds. No murmur heard.  Pulmonary:      Effort: Pulmonary effort is normal. No respiratory " distress.      Breath sounds: Normal breath sounds. No wheezing.   Musculoskeletal:         General: Normal range of motion.      Cervical back: Normal range of motion and neck supple.   Skin:     General: Skin is warm and dry.   Neurological:      Mental Status: He is alert and oriented to person, place, and time.   Psychiatric:         Mood and Affect: Mood normal.         Behavior: Behavior normal.            Labs:        Sara Russ DO    12/13/2024

## 2025-01-04 DIAGNOSIS — N52.8 OTHER MALE ERECTILE DYSFUNCTION: ICD-10-CM

## 2025-01-04 RX ORDER — SILDENAFIL 25 MG/1
TABLET, FILM COATED ORAL
Qty: 8 TABLET | Refills: 1 | Status: SHIPPED | OUTPATIENT
Start: 2025-01-04 | End: 2025-01-10

## 2025-01-09 DIAGNOSIS — N52.8 OTHER MALE ERECTILE DYSFUNCTION: ICD-10-CM

## 2025-01-10 RX ORDER — SILDENAFIL 25 MG/1
TABLET, FILM COATED ORAL
Qty: 8 TABLET | Refills: 1 | Status: SHIPPED | OUTPATIENT
Start: 2025-01-10 | End: 2025-01-13 | Stop reason: SDUPTHER

## 2025-01-13 RX ORDER — SILDENAFIL 25 MG/1
25 TABLET, FILM COATED ORAL DAILY PRN
Qty: 8 TABLET | Refills: 1 | Status: SHIPPED | OUTPATIENT
Start: 2025-01-13

## 2025-04-02 DIAGNOSIS — N52.8 OTHER MALE ERECTILE DYSFUNCTION: ICD-10-CM

## 2025-04-02 RX ORDER — SILDENAFIL 25 MG/1
25 TABLET, FILM COATED ORAL DAILY PRN
Qty: 8 TABLET | Refills: 1 | Status: SHIPPED | OUTPATIENT
Start: 2025-04-02

## 2025-04-02 NOTE — TELEPHONE ENCOUNTER
Reason for call:   [x] Refill   [] Prior Auth  [] Other:     Office:   [x] PCP/Provider -  Sara CHENG   [] Specialty/Provider -     Medication: Viagra    Dose/Frequency: 25 mg     Quantity: #8    Pharmacy: [address] [facility]   Does the patient have enough for 3 days?   [] Yes   [x] No - Send as HP to POD    Mail Away Pharmacy   Does the patient have enough for 10 days?   [] Yes   [] No - Send as HP to POD

## 2025-04-30 ENCOUNTER — TELEPHONE (OUTPATIENT)
Dept: FAMILY MEDICINE CLINIC | Facility: CLINIC | Age: 65
End: 2025-04-30

## 2025-04-30 NOTE — TELEPHONE ENCOUNTER
Patient called requesting refill for sildenafil (VIAGRA) 25 MG tablet . Patient made aware medication was refilled on 4/2/25 for 8 with 1 refills to [facility] pharmacy. Patient instructed to contact the pharmacy and speak with someone directly to obtain refills of medication. Patient advised to call back for refill if their pharmacy is unable to assist them. Patient verbalized understanding.

## 2025-05-28 ENCOUNTER — TELEPHONE (OUTPATIENT)
Age: 65
End: 2025-05-28

## 2025-06-02 DIAGNOSIS — N52.8 OTHER MALE ERECTILE DYSFUNCTION: ICD-10-CM

## 2025-06-02 RX ORDER — SILDENAFIL 25 MG/1
25 TABLET, FILM COATED ORAL DAILY PRN
Qty: 8 TABLET | Refills: 1 | Status: SHIPPED | OUTPATIENT
Start: 2025-06-02

## 2025-06-02 NOTE — TELEPHONE ENCOUNTER
Medication:  sildenafil (VIAGRA) 25 MG tablet    Dose/Frequency:   Take 1 tablet (25 mg total) by mouth daily as needed for erectile dysfunction        Quantity: 8    Pharmacy: [facility]/pharmacy #[id number] [address]     Office:   [x] PCP/Provider -   [] Speciality/Provider -     Does the patient have enough for 3 days?   [] Yes   [x] No - Send as HP to POD

## 2025-06-03 ENCOUNTER — OFFICE VISIT (OUTPATIENT)
Dept: FAMILY MEDICINE CLINIC | Facility: CLINIC | Age: 65
End: 2025-06-03
Payer: COMMERCIAL

## 2025-06-03 VITALS
TEMPERATURE: 98 F | HEIGHT: 72 IN | BODY MASS INDEX: 27.2 KG/M2 | HEART RATE: 77 BPM | WEIGHT: 200.8 LBS | RESPIRATION RATE: 17 BRPM | SYSTOLIC BLOOD PRESSURE: 124 MMHG | DIASTOLIC BLOOD PRESSURE: 82 MMHG

## 2025-06-03 DIAGNOSIS — I10 ESSENTIAL HYPERTENSION: ICD-10-CM

## 2025-06-03 DIAGNOSIS — N18.2 STAGE 2 CHRONIC KIDNEY DISEASE: ICD-10-CM

## 2025-06-03 DIAGNOSIS — M79.10 MYALGIA: Primary | ICD-10-CM

## 2025-06-03 PROCEDURE — 99214 OFFICE O/P EST MOD 30 MIN: CPT | Performed by: STUDENT IN AN ORGANIZED HEALTH CARE EDUCATION/TRAINING PROGRAM

## 2025-06-03 NOTE — PROGRESS NOTES
Name: Lonny Goldberg      : 1960      MRN: [id number]  Encounter Provider: Radha Pool MD  Encounter Date: 6/3/2025   Encounter department: [facility]  :  Assessment & Plan  Myalgia  Random intermittent myalgias; no tenderness to palpation; full ROM and strength intact throughout     Pending lab evaluation     Recommend holding OTC supplements as well   No statin use   Orders:    Lyme Total AB W Reflex to IGM/IGG; Future    Comprehensive metabolic panel; Future    CK; Future    Sedimentation rate, automated; Future    C-reactive protein; Future    TSH, 3rd generation with Free T4 reflex; Future    Aldolase; Future    Essential hypertension  BP at goal, continue with lisinopril 20 mg daily and norvasc 5 mg daily        Stage 2 chronic kidney disease  Lab Results   Component Value Date    EGFR 83 10/02/2024    EGFR 87 10/06/2023    EGFR 85 2023    CREATININE 0.96 10/02/2024    CREATININE 0.93 10/06/2023    CREATININE 0.95 2023     Avoid nephrotoxic rx               History of Present Illness   64 yo M with HTN presenting for myalgias. Notes tick exposure in past- concern for lyme. Notes random migrating myalgias present for some time. No SOB or dysphagia         Review of Systems   Constitutional:  Negative for chills, fever and unexpected weight change.   HENT:  Negative for trouble swallowing.    Eyes:  Negative for visual disturbance.   Respiratory:  Negative for cough and shortness of breath.    Cardiovascular:  Negative for chest pain and palpitations.   Gastrointestinal:  Negative for abdominal pain.   Genitourinary:  Negative for difficulty urinating, dysuria and hematuria.   Musculoskeletal:  Positive for myalgias.   Skin:  Negative for color change and rash.   Neurological:  Negative for light-headedness.   All other systems reviewed and are negative.      Objective   /82 (Patient Position: Sitting, Cuff Size: Standard)   Pulse 77   Temp 98 °F (36.7  "°C) (Temporal)   Resp 17   Ht 5' 11.7\" (1.821 m)   Wt 91.1 kg (200 lb 12.8 oz)   BMI 27.46 kg/m²      Physical Exam  Vitals and nursing note reviewed.   Constitutional:       General: He is not in acute distress.     Appearance: Normal appearance. He is well-developed.   HENT:      Head: Normocephalic and atraumatic.     Eyes:      Conjunctiva/sclera: Conjunctivae normal.       Cardiovascular:      Rate and Rhythm: Normal rate and regular rhythm.      Pulses: Normal pulses.      Heart sounds: Normal heart sounds. No murmur heard.  Pulmonary:      Effort: Pulmonary effort is normal. No respiratory distress.      Breath sounds: Normal breath sounds. No wheezing or rales.   Abdominal:      Palpations: Abdomen is soft.      Tenderness: There is no abdominal tenderness. There is no guarding.     Musculoskeletal:         General: No swelling or tenderness. Normal range of motion.      Cervical back: Normal range of motion and neck supple. No tenderness.      Right lower leg: No edema.      Left lower leg: No edema.      Comments: No muscle tenderness with palpation throughout; normal get up and go test; 5/5 strength throughout on exam    Lymphadenopathy:      Cervical: No cervical adenopathy.     Skin:     General: Skin is warm and dry.      Capillary Refill: Capillary refill takes less than 2 seconds.     Neurological:      General: No focal deficit present.      Mental Status: He is alert.      Cranial Nerves: No cranial nerve deficit.      Sensory: No sensory deficit.      Motor: No weakness.      Coordination: Coordination normal.      Gait: Gait normal.     Psychiatric:         Mood and Affect: Mood normal.       Administrative Statements   I have spent a total time of 30 minutes in caring for this patient on the day of the visit/encounter including Prognosis, Risks and benefits of tx options, Instructions for management, Patient and family education, Importance of tx compliance, Risk factor reductions, " Impressions, Counseling / Coordination of care, Documenting in the medical record, and Reviewing/placing orders in the medical record (including tests, medications, and/or procedures).

## 2025-06-03 NOTE — ASSESSMENT & PLAN NOTE
Lab Results   Component Value Date    EGFR 83 10/02/2024    EGFR 87 10/06/2023    EGFR 85 08/07/2023    CREATININE 0.96 10/02/2024    CREATININE 0.93 10/06/2023    CREATININE 0.95 08/07/2023     Avoid nephrotoxic rx

## 2025-06-07 DIAGNOSIS — I10 ESSENTIAL HYPERTENSION: ICD-10-CM

## 2025-06-08 RX ORDER — AMLODIPINE BESYLATE 5 MG/1
5 TABLET ORAL DAILY
Qty: 90 TABLET | Refills: 1 | Status: SHIPPED | OUTPATIENT
Start: 2025-06-08

## 2025-06-09 ENCOUNTER — APPOINTMENT (OUTPATIENT)
Dept: LAB | Facility: CLINIC | Age: 65
End: 2025-06-09
Attending: STUDENT IN AN ORGANIZED HEALTH CARE EDUCATION/TRAINING PROGRAM
Payer: COMMERCIAL

## 2025-06-09 DIAGNOSIS — M79.10 MYALGIA: ICD-10-CM

## 2025-06-09 LAB
CK SERPL-CCNC: 96 U/L (ref 39–308)
CRP SERPL QL: <1 MG/L
ERYTHROCYTE [SEDIMENTATION RATE] IN BLOOD: 6 MM/HOUR (ref 0–19)

## 2025-06-09 PROCEDURE — 86140 C-REACTIVE PROTEIN: CPT

## 2025-06-09 PROCEDURE — 82085 ASSAY OF ALDOLASE: CPT

## 2025-06-09 PROCEDURE — 36415 COLL VENOUS BLD VENIPUNCTURE: CPT

## 2025-06-09 PROCEDURE — 82550 ASSAY OF CK (CPK): CPT

## 2025-06-09 PROCEDURE — 85652 RBC SED RATE AUTOMATED: CPT

## 2025-06-09 PROCEDURE — 86618 LYME DISEASE ANTIBODY: CPT

## 2025-06-10 ENCOUNTER — RESULTS FOLLOW-UP (OUTPATIENT)
Dept: FAMILY MEDICINE CLINIC | Facility: CLINIC | Age: 65
End: 2025-06-10

## 2025-06-10 LAB
ALDOLASE SERPL-CCNC: 3.5 U/L (ref 3.3–10.3)
B BURGDOR IGG+IGM SER QL IA: NEGATIVE

## 2025-07-22 DIAGNOSIS — M54.2 NECK PAIN: Primary | ICD-10-CM

## 2025-08-05 DIAGNOSIS — N52.8 OTHER MALE ERECTILE DYSFUNCTION: ICD-10-CM

## 2025-08-07 RX ORDER — SILDENAFIL 25 MG/1
25 TABLET, FILM COATED ORAL DAILY PRN
Qty: 8 TABLET | Refills: 1 | Status: SHIPPED | OUTPATIENT
Start: 2025-08-07